# Patient Record
Sex: MALE | Race: WHITE | NOT HISPANIC OR LATINO | Employment: OTHER | URBAN - METROPOLITAN AREA
[De-identification: names, ages, dates, MRNs, and addresses within clinical notes are randomized per-mention and may not be internally consistent; named-entity substitution may affect disease eponyms.]

---

## 2017-01-23 ENCOUNTER — HOSPITAL ENCOUNTER (OUTPATIENT)
Dept: SLEEP MEDICINE | Facility: HOSPITAL | Age: 62
Discharge: HOME | End: 2017-01-23
Attending: INTERNAL MEDICINE | Admitting: INTERNAL MEDICINE

## 2017-01-23 PROCEDURE — G0463 HOSPITAL OUTPT CLINIC VISIT: HCPCS

## 2017-01-24 NOTE — PROGRESS NOTES
DATE OF VISIT: 01/23/2017    PROBLEM LIST:  1.  Obstructive sleep apnea with sleep-related hypoxemia, severe, with AHI of 53 on BiPAP SV due to complex central apnea.   2.  Obesity.   3.  Coronary artery disease status post recent MI.   4.  History of cerebrovascular accident.     HISTORY OF PRESENT ILLNESS: The patient is here for followup, was last seen on 01/25/2016 for sleep apnea that is severe with AHI of 53 and with complex central apnea on the CPAP. He was intolerant to the high pressure BiPAP. He has obesity and he has a history of cerebrovascular accident. Since the last visit the patient had another problem with acute myocardial infarction and he had no angioplasty necessary because it was reported to be a small vessel, but he does have history of angioplasty in the past. Other than that, he denies any change in the past medical, surgical, social, or family history; please refer to previous note for more details.     MEDICATIONS: He did not bring his list of medications but knows that they added a blood thinner, but he could not remember the name of it and I tried to mention a list of the ones we know but none of them rang a bell.  The patient is strongly advised to bring his medication list on his followup visit. He does have some mild residual hypersomnia.     REVIEW OF SYSTEMS: A 10-point system review is positive for acid reflux and depression.     PHYSICAL EXAMINATION:   VITAL SIGNS: Weight 198, blood pressure 122/79, heart rate 63.   HEENT: He has Mallampati IV airway with large uvula.   LUNGS: Unlabored.   HEART: Regular rate.   EXTREMITIES: No edema.     DIAGNOSTIC DATA: The sleep study showed AHI of 53, with sleep-related hypoxemia. Compliance download on BiPAP SV with a minimum EPAP of 9, maximum IPAP of 15 with a backup rate of 12 and a pressure support of 1. His AHI was 16.3 and this is consistent with a central apnea index of 2.3, obstructive apnea index of 9.8, and hypopnea index of 4.2.  Previous download showed a residual AHI of 13.8 as well.     ASSESSMENT:   1.  Obstructive sleep apnea with sleep-related hypoxemia, with CPAP triggered central sleep apnea switched to a BiPAP SV with need to a higher pressure for more optimal control. We will change the minimum EPAP of 12 and a maximum IPAP of 18 now that his tolerance to the pressure is much better. Will keep the pressure support of 1 and will set up the rate of 12, and will followup on the download in 2 months and consider using an oral appliance with the BiPAP at a lower pressure if he was intolerant to the higher pressure or if the higher pressure is triggering more central apneas.   2.  Obesity with instruction to work on the weight loss.   3.  Coronary artery disease, status post recent MI with the need to optimize his sleep apnea control with a goal to get his residual AHI less than 10 and preferably less than 5.   4.  History of cerebrovascular accident.  5.  Follow up in 2 months with the instruction to increase his minimum EPAP to 12 and a maximum IPAP to 18, and keep the other setting the same.         JUAN Walls:co  D:   01/23/2017 17:11:36  T:   01/24/2017 01:52:36  Job ID:   [id number]  Document ID:   [id number]  cc:   Referring Physician

## 2017-12-12 ENCOUNTER — OFFICE VISIT (OUTPATIENT)
Dept: FAMILY MEDICINE CLINIC | Facility: CLINIC | Age: 62
End: 2017-12-12

## 2017-12-12 VITALS
HEART RATE: 65 BPM | OXYGEN SATURATION: 95 % | SYSTOLIC BLOOD PRESSURE: 90 MMHG | TEMPERATURE: 98 F | BODY MASS INDEX: 31.99 KG/M2 | DIASTOLIC BLOOD PRESSURE: 74 MMHG | WEIGHT: 192 LBS | HEIGHT: 65 IN

## 2017-12-12 DIAGNOSIS — Z12.5 SPECIAL SCREENING EXAMINATION FOR NEOPLASM OF PROSTATE: Primary | ICD-10-CM

## 2017-12-12 DIAGNOSIS — Z86.73 HISTORY OF STROKE: ICD-10-CM

## 2017-12-12 DIAGNOSIS — I25.10 CORONARY ARTERY DISEASE INVOLVING NATIVE CORONARY ARTERY OF NATIVE HEART WITHOUT ANGINA PECTORIS: ICD-10-CM

## 2017-12-12 DIAGNOSIS — E78.5 HYPERLIPIDEMIA, UNSPECIFIED HYPERLIPIDEMIA TYPE: ICD-10-CM

## 2017-12-12 LAB
DEVELOPER EXPIRATION DATE: NORMAL
DEVELOPER LOT NUMBER: NORMAL
EXPIRATION DATE: NORMAL
FECAL OCCULT BLOOD SCREEN, POC: NEGATIVE
Lab: NORMAL
NEGATIVE CONTROL: NEGATIVE
POSITIVE CONTROL: POSITIVE

## 2017-12-12 PROCEDURE — 99214 OFFICE O/P EST MOD 30 MIN: CPT | Performed by: FAMILY MEDICINE

## 2017-12-12 PROCEDURE — 82270 OCCULT BLOOD FECES: CPT | Performed by: FAMILY MEDICINE

## 2017-12-12 RX ORDER — LANSOPRAZOLE 30 MG/1
30 CAPSULE, DELAYED RELEASE ORAL DAILY
Qty: 90 CAPSULE | Refills: 3 | Status: SHIPPED | OUTPATIENT
Start: 2017-12-12 | End: 2018-12-11 | Stop reason: SDUPTHER

## 2017-12-12 RX ORDER — CITALOPRAM 20 MG/1
20 TABLET ORAL DAILY
Qty: 90 TABLET | Refills: 3 | Status: SHIPPED | OUTPATIENT
Start: 2017-12-12 | End: 2018-12-11 | Stop reason: SDUPTHER

## 2017-12-12 RX ORDER — CITALOPRAM 20 MG/1
20 TABLET ORAL DAILY
COMMUNITY
End: 2017-12-12 | Stop reason: SDUPTHER

## 2017-12-12 NOTE — PROGRESS NOTES
SUBJECTIVE:  The patient is a 62-year-old white male is here for follow-up.  He has a history of stroke and coronary artery disease.  He had labs previously by his cardiologist.  These are reviewed.  He is due a prostate exam.  He complains of carpal tunnel syndrome and would like a referral for this.  He's tried preventative measures that have been unsuccessful.  It's affecting both his wrists and hands.     PAST MEDICAL HISTORY:  Reviewed.    REVIEW OF SYSTEMS:  Please see above; 14 point ROS otherwise negative.      OBJECTIVE: Vitals signs are reviewed and are stable.    HEENT: PERRLA.    Neck:  Supple.    Lungs:  Clear.    Heart:  Regular rate and rhythm.    Abdomen:   Soft, nontender.    Rectal: Soft 1-2+ nontender no masses.  Hemoccult negative.  Extremities:  No cyanosis, clubbing or edema.      ASSESSMENT:    History of stroke  Urinary artery disease  Tunnel syndrome  ProState exam    PLAN:  PSA ordered.  Patient will find out from his wife when his next colonoscopy is due.  He's referred to a hand surgeon for carpal tunnel syndrome.  Diet and exercise discussed.    Much of this encounter note is an electronic transcription/translation of spoken language to printed text.  The electronic translation of spoken language may permit erroneous, or at times, nonsensical words or phrases to be inadvertently transcribed.  Although I have reviewed the note for such errors, some may still exist.

## 2018-04-30 ENCOUNTER — OFFICE VISIT (OUTPATIENT)
Dept: SLEEP MEDICINE | Facility: HOSPITAL | Age: 63
End: 2018-04-30
Attending: INTERNAL MEDICINE

## 2018-04-30 VITALS
DIASTOLIC BLOOD PRESSURE: 77 MMHG | SYSTOLIC BLOOD PRESSURE: 140 MMHG | HEIGHT: 65 IN | HEART RATE: 64 BPM | BODY MASS INDEX: 31.57 KG/M2 | OXYGEN SATURATION: 98 % | WEIGHT: 189.5 LBS

## 2018-04-30 DIAGNOSIS — E66.9 OBESITY (BMI 30-39.9): ICD-10-CM

## 2018-04-30 DIAGNOSIS — G47.33 OSA ON CPAP: Primary | ICD-10-CM

## 2018-04-30 DIAGNOSIS — I69.828: ICD-10-CM

## 2018-04-30 DIAGNOSIS — I74.9 PARADOXICAL EMBOLISM (HCC): ICD-10-CM

## 2018-04-30 DIAGNOSIS — I10 BENIGN ESSENTIAL HTN: ICD-10-CM

## 2018-04-30 DIAGNOSIS — Z99.89 OSA ON CPAP: Primary | ICD-10-CM

## 2018-04-30 PROCEDURE — G0463 HOSPITAL OUTPT CLINIC VISIT: HCPCS

## 2018-04-30 RX ORDER — ISOSORBIDE MONONITRATE 30 MG/1
TABLET, EXTENDED RELEASE ORAL
COMMUNITY
Start: 2018-04-10

## 2018-09-13 ENCOUNTER — TRANSCRIBE ORDERS (OUTPATIENT)
Dept: ADMINISTRATIVE | Facility: HOSPITAL | Age: 63
End: 2018-09-13

## 2018-09-13 DIAGNOSIS — G56.03 BILATERAL CARPAL TUNNEL SYNDROME: Primary | ICD-10-CM

## 2018-09-27 ENCOUNTER — HOSPITAL ENCOUNTER (OUTPATIENT)
Dept: INFUSION THERAPY | Facility: HOSPITAL | Age: 63
Discharge: HOME | End: 2018-09-27
Attending: SURGERY | Admitting: PSYCHIATRY & NEUROLOGY

## 2018-09-27 DIAGNOSIS — G56.03 BILATERAL CARPAL TUNNEL SYNDROME: ICD-10-CM

## 2018-09-27 PROCEDURE — 95886 MUSC TEST DONE W/N TEST COMP: CPT

## 2018-09-27 PROCEDURE — 95911 NRV CNDJ TEST 9-10 STUDIES: CPT | Performed by: PSYCHIATRY & NEUROLOGY

## 2018-09-27 PROCEDURE — 95911 NRV CNDJ TEST 9-10 STUDIES: CPT

## 2018-09-27 PROCEDURE — 95886 MUSC TEST DONE W/N TEST COMP: CPT | Performed by: PSYCHIATRY & NEUROLOGY

## 2018-12-11 RX ORDER — LANSOPRAZOLE 30 MG/1
30 CAPSULE, DELAYED RELEASE ORAL DAILY
Qty: 90 CAPSULE | Refills: 0 | Status: SHIPPED | OUTPATIENT
Start: 2018-12-11 | End: 2019-02-05 | Stop reason: SDUPTHER

## 2018-12-11 RX ORDER — CITALOPRAM 20 MG/1
20 TABLET ORAL DAILY
Qty: 90 TABLET | Refills: 0 | Status: SHIPPED | OUTPATIENT
Start: 2018-12-11 | End: 2019-02-05 | Stop reason: SDUPTHER

## 2019-02-05 ENCOUNTER — OFFICE VISIT (OUTPATIENT)
Dept: FAMILY MEDICINE CLINIC | Facility: CLINIC | Age: 64
End: 2019-02-05

## 2019-02-05 VITALS
HEART RATE: 58 BPM | RESPIRATION RATE: 18 BRPM | BODY MASS INDEX: 32.49 KG/M2 | OXYGEN SATURATION: 98 % | DIASTOLIC BLOOD PRESSURE: 60 MMHG | SYSTOLIC BLOOD PRESSURE: 118 MMHG | HEIGHT: 65 IN | WEIGHT: 195 LBS | TEMPERATURE: 98 F

## 2019-02-05 DIAGNOSIS — I10 BENIGN ESSENTIAL HTN: ICD-10-CM

## 2019-02-05 DIAGNOSIS — Z12.11 SCREENING FOR COLON CANCER: ICD-10-CM

## 2019-02-05 DIAGNOSIS — I25.10 CORONARY ARTERY DISEASE INVOLVING NATIVE CORONARY ARTERY OF NATIVE HEART WITHOUT ANGINA PECTORIS: ICD-10-CM

## 2019-02-05 DIAGNOSIS — E66.9 OBESITY (BMI 30-39.9): ICD-10-CM

## 2019-02-05 DIAGNOSIS — K21.9 GASTROESOPHAGEAL REFLUX DISEASE WITHOUT ESOPHAGITIS: ICD-10-CM

## 2019-02-05 DIAGNOSIS — Z86.73 HISTORY OF STROKE: ICD-10-CM

## 2019-02-05 DIAGNOSIS — E78.2 MIXED HYPERLIPIDEMIA: ICD-10-CM

## 2019-02-05 DIAGNOSIS — Z12.5 SCREENING FOR PROSTATE CANCER: ICD-10-CM

## 2019-02-05 DIAGNOSIS — R79.89 ELEVATED SERUM CREATININE: Primary | ICD-10-CM

## 2019-02-05 LAB
ANION GAP SERPL CALCULATED.3IONS-SCNC: 11.6 MMOL/L
BACTERIA UR QL AUTO: NORMAL /HPF
BILIRUB UR QL STRIP: NEGATIVE
BUN BLD-MCNC: 19 MG/DL (ref 8–23)
BUN/CREAT SERPL: 18.8 (ref 7–25)
CALCIUM SPEC-SCNC: 9.4 MG/DL (ref 8.6–10.5)
CHLORIDE SERPL-SCNC: 106 MMOL/L (ref 98–107)
CLARITY UR: CLEAR
CO2 SERPL-SCNC: 27.4 MMOL/L (ref 22–29)
COLOR UR: YELLOW
CREAT BLD-MCNC: 1.01 MG/DL (ref 0.76–1.27)
DEVELOPER EXPIRATION DATE: NORMAL
DEVELOPER LOT NUMBER: NORMAL
EXPIRATION DATE: NORMAL
FECAL OCCULT BLOOD SCREEN, POC: NEGATIVE
GFR SERPL CREATININE-BSD FRML MDRD: 75 ML/MIN/1.73
GLUCOSE BLD-MCNC: 86 MG/DL (ref 65–99)
GLUCOSE UR STRIP-MCNC: NEGATIVE MG/DL
HGB UR QL STRIP.AUTO: NEGATIVE
KETONES UR QL STRIP: NEGATIVE
LEUKOCYTE ESTERASE UR QL STRIP.AUTO: NEGATIVE
Lab: NORMAL
NEGATIVE CONTROL: NEGATIVE
NITRITE UR QL STRIP: NEGATIVE
PH UR STRIP.AUTO: 6 [PH] (ref 4.6–8)
POSITIVE CONTROL: POSITIVE
POTASSIUM BLD-SCNC: 4.6 MMOL/L (ref 3.5–5.2)
PROT UR QL STRIP: NEGATIVE
PSA SERPL-MCNC: 0.14 NG/ML (ref 0–4)
RBC # UR: NORMAL /HPF
REF LAB TEST METHOD: NORMAL
SODIUM BLD-SCNC: 145 MMOL/L (ref 136–145)
SP GR UR STRIP: >=1.03 (ref 1–1.03)
SQUAMOUS #/AREA URNS HPF: NORMAL /HPF
UROBILINOGEN UR QL STRIP: NORMAL
WBC UR QL AUTO: NORMAL /HPF

## 2019-02-05 PROCEDURE — 80048 BASIC METABOLIC PNL TOTAL CA: CPT | Performed by: FAMILY MEDICINE

## 2019-02-05 PROCEDURE — 99214 OFFICE O/P EST MOD 30 MIN: CPT | Performed by: FAMILY MEDICINE

## 2019-02-05 PROCEDURE — G0438 PPPS, INITIAL VISIT: HCPCS | Performed by: FAMILY MEDICINE

## 2019-02-05 PROCEDURE — 36415 COLL VENOUS BLD VENIPUNCTURE: CPT | Performed by: FAMILY MEDICINE

## 2019-02-05 PROCEDURE — 96160 PT-FOCUSED HLTH RISK ASSMT: CPT | Performed by: FAMILY MEDICINE

## 2019-02-05 PROCEDURE — G0103 PSA SCREENING: HCPCS | Performed by: FAMILY MEDICINE

## 2019-02-05 PROCEDURE — 81001 URINALYSIS AUTO W/SCOPE: CPT | Performed by: FAMILY MEDICINE

## 2019-02-05 PROCEDURE — 82270 OCCULT BLOOD FECES: CPT | Performed by: FAMILY MEDICINE

## 2019-02-05 RX ORDER — PITAVASTATIN CALCIUM 4.18 MG/1
4 TABLET, FILM COATED ORAL DAILY
COMMUNITY
Start: 2018-12-23 | End: 2022-05-24

## 2019-02-05 RX ORDER — LANSOPRAZOLE 30 MG/1
30 CAPSULE, DELAYED RELEASE ORAL DAILY
Qty: 90 CAPSULE | Refills: 3 | Status: SHIPPED | OUTPATIENT
Start: 2019-02-05 | End: 2022-05-24 | Stop reason: SDUPTHER

## 2019-02-05 RX ORDER — CITALOPRAM 20 MG/1
20 TABLET ORAL DAILY
Qty: 90 TABLET | Refills: 3 | Status: SHIPPED | OUTPATIENT
Start: 2019-02-05 | End: 2020-03-17 | Stop reason: SDUPTHER

## 2019-02-05 NOTE — PROGRESS NOTES
SUBJECTIVE:  The patient is a 63-year-old white male who comes in for follow-up.  He was noted to have some elevated renal function on some outside lab.  He has history of kidney stones and was having some flank pain but this is resolved.  He has a history of stroke coronary artery disease GERD.  He states he is forgetful at times since he's had a stroke.  He is due a prostate exam.  PAST MEDICAL HISTORY:  Reviewed.    REVIEW OF SYSTEMS:  Please see above; 14 point ROS negative.      OBJECTIVE: Vitals signs are reviewed and are stable.   And oriented ×3  HEENT: PERRLA.   Neck:  Supple.   Lungs:  Clear.    Heart:  Regular rate and rhythm.   Abdomen:   Soft, nontender.  Rectal: Prostate 1-2+ soft nontender.  No masses.  Hemoccult negative.   Extremities:  No cyanosis, clubbing or edema.     ASSESSMENT:     Elevated renal function  Coronary artery disease  History of stroke  Hyperlipidemia  GERD  Prostate exam  PLAN: CMP PSA, urinalysis ordered.  Labs are reviewed.  He will follow up on labs.  Medications refilled.  Healthy lifestyle discussed.  He will call if problems.  He is advised to avoid NSAIDs.  Ultrasound kidneys ordered.  Follow up on labs.      Dictated utilizing Dragon dictation.

## 2019-02-05 NOTE — PROGRESS NOTES
QUICK REFERENCE INFORMATION:  The ABCs of the Annual Wellness Visit    Initial Medicare Wellness Visit    HEALTH RISK ASSESSMENT    1955    Recent Hospitalizations:  Recently treated at the following:  Other: [facility].        Current Medical Providers:  Patient Care Team:  Gary Camejo MD as PCP - General  Gary Camejo MD as PCP - Family Medicine  Cali Gibbons MD as Consulting Physician (Interventional Cardiology)  Tmoi Jeffers MD as Consulting Physician (Pulmonary Disease)  William Gloria MD as Consulting Physician (Hand Surgery)        Smoking Status:  Social History     Tobacco Use   Smoking Status Never Smoker       Alcohol Consumption:  Social History     Substance and Sexual Activity   Alcohol Use No    Comment: RARELY       Depression Screen:   PHQ-2/PHQ-9 Depression Screening 2/5/2019   Little interest or pleasure in doing things 0   Feeling down, depressed, or hopeless 1   Trouble falling or staying asleep, or sleeping too much 0   Feeling tired or having little energy 1   Poor appetite or overeating 0   Feeling bad about yourself - or that you are a failure or have let yourself or your family down 0   Trouble concentrating on things, such as reading the newspaper or watching television 1   Moving or speaking so slowly that other people could have noticed. Or the opposite - being so fidgety or restless that you have been moving around a lot more than usual 0   Thoughts that you would be better off dead, or of hurting yourself in some way 0   Total Score 3   If you checked off any problems, how difficult have these problems made it for you to do your work, take care of things at home, or get along with other people? Somewhat difficult       Health Habits and Functional and Cognitive Screening:  Functional & Cognitive Status 2/5/2019   Do you have difficulty preparing food and eating? No   Do you have difficulty bathing yourself, getting dressed or grooming yourself? No   Do you have  difficulty using the toilet? No   Do you have difficulty moving around from place to place? No   Do you have trouble with steps or getting out of a bed or a chair? No   In the past year have you fallen or experienced a near fall? No   Current Diet Well Balanced Diet   Dental Exam Up to date   Eye Exam Up to date   Exercise (times per week) 2 times per week   Current Exercise Activities Include No Regular Exercise   Do you need help using the phone?  No   Are you deaf or do you have serious difficulty hearing?  No   Do you need help with transportation? No   Do you need help shopping? No   Do you need help preparing meals?  No   Do you need help with housework?  No   Do you need help with laundry? No   Do you need help taking your medications? No   Do you need help managing money? No   Do you ever drive or ride in a car without wearing a seat belt? No   Have you felt unusual stress, anger or loneliness in the last month? No   Who do you live with? Spouse   If you need help, do you have trouble finding someone available to you? No   Have you been bothered in the last four weeks by sexual problems? No   Do you have difficulty concentrating, remembering or making decisions? No           Does the patient have evidence of cognitive impairment? No    Asiprin use counseling: Taking ASA appropriately as indicated      Recent Lab Results:    Visual Acuity:  No exam data present    Age-appropriate Screening Schedule:  Refer to the list below for future screening recommendations based on patient's age, sex and/or medical conditions. Orders for these recommended tests are listed in the plan section. The patient has been provided with a written plan.    Health Maintenance   Topic Date Due   • TDAP/TD VACCINES (1 - Tdap) 11/14/1975   • ZOSTER VACCINE (1 of 2) 11/14/2006   • LIPID PANEL  12/07/2017   • COLONOSCOPY  07/25/2018   • INFLUENZA VACCINE  Completed        Subjective   History of Present Illness    Nain Rangel is a  63 y.o. male who presents for an Annual Wellness Visit.    The following portions of the patient's history were reviewed and updated as appropriate: past family history.    Outpatient Medications Prior to Visit   Medication Sig Dispense Refill   • aspirin 81 MG tablet Take 81 mg by mouth Daily.     • clopidogrel (PLAVIX) 75 MG tablet Take 75 mg by mouth daily.     • ezetimibe (ZETIA) 10 MG tablet Take 10 mg by mouth daily.     • folic acid (FOLVITE) 1 MG tablet Take 1 mg by mouth daily.     • isosorbide mononitrate (IMDUR) 30 MG 24 hr tablet      • LIVALO 4 MG tablet Take 4 mg by mouth Daily.     • ranolazine (RANEXA) 1000 MG 12 hr tablet Take 500 mg by mouth 2 (Two) Times a Day.     • citalopram (CeleXA) 20 MG tablet TAKE 1 TABLET BY MOUTH DAILY 90 tablet 0   • lansoprazole (PREVACID) 30 MG capsule TAKE 1 CAPSULE BY MOUTH DAILY 90 capsule 0   • pravastatin (PRAVACHOL) 10 MG tablet Take 10 mg by mouth daily.  5   • PYRIDOXINE HCL PO Take  by mouth Daily.       No facility-administered medications prior to visit.        Patient Active Problem List   Diagnosis   • Stroke (CMS/HCC)   • Oth speech/lang deficits following oth cerebvasc disease   • Myocardial infarction (CMS/HCC)   • Hyperlipidemia   • Dizzy spells   • Cognitive deficits following cerebral infarction   • Chest discomfort   • MARCOS on CPAP   • Paradoxical embolism (CMS/HCC)   • Benign essential HTN   • Obesity (BMI 30-39.9)       Advance Care Planning:  has an advance directive - a copy HAS NOT been provided    Identification of Risk Factors:  Risk factors include: increased fall risk.    Review of Systems    Compared to one year ago, the patient feels his physical health is the same.  Compared to one year ago, the patient feels his mental health is the same.    Objective     Physical Exam    Vitals:    02/05/19 0837   BP: 118/60   BP Location: Left arm   Patient Position: Sitting   Pulse: 58   Resp: 18   Temp: 98 °F (36.7 °C)   TempSrc: Oral   SpO2: 98%  "  Weight: 88.5 kg (195 lb)   Height: 165.1 cm (65\")   PainSc: 0-No pain       Patient's Body mass index is 32.45 kg/m². BMI is above normal parameters. Recommendations include: no follow-up required.      Assessment/Plan   Patient Self-Management and Personalized Health Advice  The patient has been provided with information about: fall prevention and preventive services including:   · Fall Risk assessment done.    Visit Diagnoses:    ICD-10-CM ICD-9-CM   1. Elevated serum creatinine R79.89 790.99   2. Coronary artery disease involving native coronary artery of native heart without angina pectoris I25.10 414.01   3. History of stroke Z86.73 V12.54   4. Mixed hyperlipidemia E78.2 272.2   5. Obesity (BMI 30-39.9) E66.9 278.00   6. Benign essential HTN I10 401.1   7. Gastroesophageal reflux disease without esophagitis K21.9 530.81   8. Screening for prostate cancer Z12.5 V76.44       Orders Placed This Encounter   Procedures   • US Renal Bilateral     Standing Status:   Future     Standing Expiration Date:   2/5/2020     Order Specific Question:   Reason for Exam:     Answer:   Elevated creatinine   • Basic Metabolic Panel   • PSA Screen     Standing Status:   Future     Number of Occurrences:   1     Standing Expiration Date:   2/5/2020   • Urinalysis without microscopic (no culture) - Urine, Clean Catch   • Urinalysis, Microscopic Only - Urine, Clean Catch   • Urinalysis With Microscopic - Urine, Clean Catch       Outpatient Encounter Medications as of 2/5/2019   Medication Sig Dispense Refill   • aspirin 81 MG tablet Take 81 mg by mouth Daily.     • citalopram (CeleXA) 20 MG tablet Take 1 tablet by mouth Daily. 90 tablet 3   • clopidogrel (PLAVIX) 75 MG tablet Take 75 mg by mouth daily.     • ezetimibe (ZETIA) 10 MG tablet Take 10 mg by mouth daily.     • folic acid (FOLVITE) 1 MG tablet Take 1 mg by mouth daily.     • isosorbide mononitrate (IMDUR) 30 MG 24 hr tablet      • lansoprazole (PREVACID) 30 MG capsule Take " 1 capsule by mouth Daily. 90 capsule 3   • LIVALO 4 MG tablet Take 4 mg by mouth Daily.     • ranolazine (RANEXA) 1000 MG 12 hr tablet Take 500 mg by mouth 2 (Two) Times a Day.     • [DISCONTINUED] citalopram (CeleXA) 20 MG tablet TAKE 1 TABLET BY MOUTH DAILY 90 tablet 0   • [DISCONTINUED] lansoprazole (PREVACID) 30 MG capsule TAKE 1 CAPSULE BY MOUTH DAILY 90 capsule 0   • pravastatin (PRAVACHOL) 10 MG tablet Take 10 mg by mouth daily.  5   • PYRIDOXINE HCL PO Take  by mouth Daily.       No facility-administered encounter medications on file as of 2/5/2019.        Reviewed use of high risk medication in the elderly: not applicable  Reviewed for potential of harmful drug interactions in the elderly: not applicable    Follow Up:  No Follow-up on file.     An After Visit Summary and PPPS with all of these plans were given to the patient.

## 2019-02-05 NOTE — PATIENT INSTRUCTIONS
Medicare Wellness  Personal Prevention Plan of Service     Date of Office Visit:  2019  Encounter Provider:  Gary Camejo MD  Place of Service:  [facility]  Patient Name: Nain Rangel  :  1955    As part of the Medicare Wellness portion of your visit today, we are providing you with this personalized preventive plan of services (PPPS). This plan is based upon recommendations of the [place] Preventive Services Task Force (USPSTF) and the Advisory Committee on Immunization Practices (ACIP).    This lists the preventive care services that should be considered, and provides dates of when you are due. Items listed as completed are up-to-date and do not require any further intervention.    Health Maintenance   Topic Date Due   • TDAP/TD VACCINES (1 - Tdap) 1975   • ZOSTER VACCINE (1 of 2) 2006   • HEPATITIS C SCREENING  2016   • MEDICARE ANNUAL WELLNESS  2016   • LIPID PANEL  2017   • COLONOSCOPY  2018   • INFLUENZA VACCINE  Completed       Orders Placed This Encounter   Procedures   • US Renal Bilateral     Standing Status:   Future     Standing Expiration Date:   2020     Order Specific Question:   Reason for Exam:     Answer:   Elevated creatinine   • Basic Metabolic Panel   • PSA Screen     Standing Status:   Future     Number of Occurrences:   1     Standing Expiration Date:   2020   • Urinalysis without microscopic (no culture) - Urine, Clean Catch   • Urinalysis, Microscopic Only - Urine, Clean Catch   • Urinalysis With Microscopic - Urine, Clean Catch       No Follow-up on file.

## 2019-02-14 ENCOUNTER — APPOINTMENT (OUTPATIENT)
Dept: ULTRASOUND IMAGING | Facility: HOSPITAL | Age: 64
End: 2019-02-14

## 2019-02-18 ENCOUNTER — HOSPITAL ENCOUNTER (OUTPATIENT)
Dept: ULTRASOUND IMAGING | Facility: HOSPITAL | Age: 64
Discharge: HOME | End: 2019-02-18
Admitting: FAMILY MEDICINE

## 2019-02-18 DIAGNOSIS — R79.89 ELEVATED SERUM CREATININE: ICD-10-CM

## 2019-02-18 PROCEDURE — 76775 US EXAM ABDO BACK WALL LIM: CPT

## 2019-04-29 ENCOUNTER — OFFICE VISIT (OUTPATIENT)
Dept: SLEEP MEDICINE | Facility: HOSPITAL | Age: 64
End: 2019-04-29
Attending: INTERNAL MEDICINE

## 2019-04-29 VITALS
OXYGEN SATURATION: 95 % | WEIGHT: 191.8 LBS | BODY MASS INDEX: 31.96 KG/M2 | HEART RATE: 64 BPM | HEIGHT: 65 IN | SYSTOLIC BLOOD PRESSURE: 103 MMHG | DIASTOLIC BLOOD PRESSURE: 69 MMHG

## 2019-04-29 DIAGNOSIS — G47.30 HYPERSOMNIA WITH SLEEP APNEA: ICD-10-CM

## 2019-04-29 DIAGNOSIS — Z99.89 OSA ON CPAP: Primary | ICD-10-CM

## 2019-04-29 DIAGNOSIS — G47.33 OSA ON CPAP: Primary | ICD-10-CM

## 2019-04-29 DIAGNOSIS — I10 BENIGN ESSENTIAL HTN: ICD-10-CM

## 2019-04-29 DIAGNOSIS — I74.9 PARADOXICAL EMBOLISM (HCC): ICD-10-CM

## 2019-04-29 DIAGNOSIS — E66.9 OBESITY (BMI 30-39.9): ICD-10-CM

## 2019-04-29 DIAGNOSIS — G47.10 HYPERSOMNIA WITH SLEEP APNEA: ICD-10-CM

## 2019-04-29 DIAGNOSIS — G47.34 SLEEP RELATED HYPOXIA: ICD-10-CM

## 2019-04-29 PROCEDURE — G0463 HOSPITAL OUTPT CLINIC VISIT: HCPCS

## 2019-04-29 NOTE — PROGRESS NOTES
Sleep Disorder Follow Up    Patient Name: Nain Rangel  Age/Sex: 63 y.o. male  : 1955  MRN: [id number]    Date of Encounter Visit: 19   Referring Provider: Tomi Jeffers MD  Place of Service: [facility]  Patient Care Team:  Gary Camejo MD as PCP - General  Gary Camejo MD as PCP - Family Medicine  EdwigeCali wells MD as Consulting Physician (Interventional Cardiology)  Tomi Jeffers MD as Consulting Physician (Pulmonary Disease)  William Gloria MD as Consulting Physician (Hand Surgery)    PROBLEM LIST:  1. Severe obstructive sleep apnea with complex central apnea on BiPAP SV  2. Sleep-related hypoxemia  3. Obesity  4. Coronary artery disease status post myocardial infarction  5. History of cerebrovascular accident    History of Present Illness:  Nain Rangel is a 63 y.o. male who was last seen in the office on 2018 for Severe obstructive sleep apnea with sleep-related hypoxemia, obesity, coronary artery disease and prior stroke.  Patient had a sleep study in  that showed an overall AHI of 53 with significant sleep-related hypoxemia and complex PAP triggered central apneas. Patient was intolerant to CPAP and titration study showed improvement of central apneas and hypoxemia on BiPAP SV at higher pressures and was started on auto with max IPAP of 25, min EPAP 20, PS of 2-4 and rate of 12 BPM. He was intolerant to the BiPAP at higher pressures and pressure was reduced. On 17, he had a residual AHI of 16.3 (was 13.8 previously) and were mostly obstructive and hypopneas so his pressure was increased to min EPAP 12, max IPAP of 18 and continued pressure support of 1. On 2018 we increased the minimum EPAP to 13 and the pressure support to 2  Patient sleeps better on the BiPAP, his daytime hypersomnia have resolved and he is happy with the machine and plans to continue to use it    Since last visit, he has been doing well overall with  no complaints, no new medical problems.  He did have a recent stress test and was shown to have some left ventricular hypertrophy, his cardiologist ordered a sleep study however after being informed that he is already on the BiPAP they want to make sure that his sleep apnea is well controlled.  Patient is on BiPAP and uses it every night with no complaints from the mask or the pressure.  Patient uses a full face mask, which does fit well. Patient denies any air leak or dry mouth.   Patient's equipment supplier is [employer] and last mask replacement was about 3 months ago. He needs to change to another DME because  Medicare changes  Patient sleeps better and has a deeper sleep with the BiPAP and feels more energy during the day time.  Current BiPAP SV setting auto with min EPAP 13, MAX IPAP 18, PS 2 cm H20 with rate of 12.  [place] Sleepiness Scale (ESS) is 2.  Compliance download was reviewed and documented below.  Weight is up 2 pounds since last visit.  Other comorbidities include CAD s/p MI, CVA, ASD s/p closure, HTN, HLD, paradoxical embolism and obesity.     Review of Systems:   A ten-system review was conducted and was negative except for acid reflux/heartburn.   Please refer to the follow up sleep questionnaire page one for details.    Past Medical History:  Past medical, surgical, social, and family history, except as mentioned above, was unchanged from the last visit.     Past Medical History:   Diagnosis Date   • Chest discomfort     PRESUME MUSCULOSKELETAL   • Cognitive deficits following cerebral infarction    • Dizzy spells    • Heart attack (CMS/HCC)    • Hyperlipidemia    • Myocardial infarction (CMS/HCC)    • Oth speech/lang deficits following oth cerebvasc disease    • Stroke (CMS/HCC)        Past Surgical History:   Procedure Laterality Date   • BACK SURGERY     • CARDIAC SURGERY      stent    • COLONOSCOPY     • CORONARY STENT PLACEMENT  2011       Home Medications:     Current Outpatient Medications:  "  •  aspirin 81 MG tablet, Take 81 mg by mouth Daily., Disp: , Rfl:   •  citalopram (CeleXA) 20 MG tablet, Take 1 tablet by mouth Daily., Disp: 90 tablet, Rfl: 3  •  clopidogrel (PLAVIX) 75 MG tablet, Take 75 mg by mouth daily., Disp: , Rfl:   •  ezetimibe (ZETIA) 10 MG tablet, Take 10 mg by mouth daily., Disp: , Rfl:   •  folic acid (FOLVITE) 1 MG tablet, Take 1 mg by mouth daily., Disp: , Rfl:   •  isosorbide mononitrate (IMDUR) 30 MG 24 hr tablet, , Disp: , Rfl:   •  lansoprazole (PREVACID) 30 MG capsule, Take 1 capsule by mouth Daily., Disp: 90 capsule, Rfl: 3  •  LIVALO 4 MG tablet, Take 4 mg by mouth Daily., Disp: , Rfl:   •  pravastatin (PRAVACHOL) 10 MG tablet, Take 10 mg by mouth daily., Disp: , Rfl: 5  •  PYRIDOXINE HCL PO, Take  by mouth Daily., Disp: , Rfl:   •  ranolazine (RANEXA) 1000 MG 12 hr tablet, Take 500 mg by mouth 2 (Two) Times a Day., Disp: , Rfl:     Allergies:  No Known Allergies    Past Social History:  Social History     Socioeconomic History   • Marital status:      Spouse name: Not on file   • Number of children: Not on file   • Years of education: Not on file   • Highest education level: Not on file   Tobacco Use   • Smoking status: Never Smoker   Substance and Sexual Activity   • Alcohol use: No     Comment: RARELY   • Drug use: No       Past Family History:  Family History   Problem Relation Age of Onset   • Heart disease Mother    • Hypertension Mother    • Alcohol abuse Mother         NOT FOR MANY YEARS   • Cancer Sister    • Heart disease Father          Objective:   Done and documented on sleep disorders center physical examination sheet, please refer to hand written note on the chart for details about the other pertinent negative findings.    Vital Signs:   Visit Vitals  /69 (BP Location: Right arm, Patient Position: Sitting)   Pulse 64   Ht 165.1 cm (65\")   Wt 87 kg (191 lb 12.8 oz)   SpO2 95%   BMI 31.92 kg/m²     Wt Readings from Last 3 Encounters:   04/29/19 87 " kg (191 lb 12.8 oz)   02/05/19 88.5 kg (195 lb)   04/30/18 86 kg (189 lb 8 oz)          Physical Exam:   General: AAOx3. Normal mood and affect.   HEENT:  Moist mucous membranes.  Septum midline. Mallampati 4 airway, large tongue and slightly enlarged uvula  LUNGS: Non-labored breathing. CTAB. No wheezes.  HEART: Regular rate and rhythm. No murmur. Normal s1/s2  EXTREMITIES: no edema. No cyanosis or clubbing. Normal gait    Diagnostic Data:  NPSG on 11/28/15 and a weight of 198 pounds showed an overall AHI of 53.4 then increase to 65 while on right side.  Significant sleep-related hypoxia with thiago desaturation of 60.9% and 20.8 minutes of total sleep time spent at less than 90% with 6.6 minutes spent at less than 80%.  Arousal index of 53.8.    Titration study on 11/28/15.  Patient was titrated on CPAP of 5-12 and was intolerant was changed to a BiPAP.  BiPAP titration from 12/8 18/14 and had residual hypoxia and central apnea triggered at lower pressures.  Patient was started on BiPAP SV and central apnea and improved especially with higher pressures.  Recommended auto BiPAP SV with IPAP of 25, EPAP of 20 pressures support of 4-8 with a backup rate of 12.    Overnight oximetry in 2015 on BiPAP SV at settings of IPAP 25, EPAP 20, PS 4-8 with backup rate of 12 showed only 56 seconds below 89% with 2 clusters of desaturations thought to be positional or REM related.    Compliance download on BiPAP SV from 1/23/17 with a minimum EPAP of 9, maximum IPAP of 15 with a backup rate of 12 and a pressure support of 1. His AHI was 16.3 and this is consistent with a central apnea index of 2.3, obstructive apnea index of 9.8, and hypopnea index of 4.2. Previous download showed a residual AHI of 13.8 as well.     Compliance download from 3/31/18 to 4/29/18 showed 100% compliant with average use of 6 hours and 55 minutes area did residual AHI of 14.8 (CA 2.3, OA 6.7, HI 5.6) on auto BiPAP SV with a minimum EPAP 12, max IPAP 18  and PS of 1 with breath rate of 12.  90th percentile EPAP of 13.8 cm H2O average EPAP of 12.8 cm H2O.  3.2% of night and periodic breathing with 34 seconds of average time spent with large air leak.  Average breath rate of 15.3 breaths per minute.  Average minute event of 6.9.    Compliance download 4/29/2018: 100% with 7 hours and 49 min, leak is minimal and residual AHI is 12.2  On BiPAP SV min EPAP 13, Max pressure 18, PS 2 and residual AHI od 12.2     Assessment and Plan:       ICD-10-CM ICD-9-CM   1. MARCOS on CPAP G47.33 327.23    Z99.89 V46.8   2. Obesity (BMI 30-39.9) E66.9 278.00   3. Paradoxical embolism (CMS/[facility]) I74.9 444.9   4. Benign essential HTN I10 401.1   5. Sleep related hypoxia G47.34 327.24       Recommendations:   Patient had severe MARCOS with emergence of complex central apnea on the PAP requiring the use of BiPAP SV auto mode. based on the last download we had to adjust the pressure to a higher limit and that has resulted in minor improvement in the residual AHI and now it is down in the mild range of 12.2.  Further increase in the pressure should result in further improvement and the patient was reluctant given his previous experience with higher pressures, but he is willing to give it a try if we have only a modest increase in the pressure range.  The overnight oximetry done on the BiPAP SV showed that there is no residual sleep-related hypoxemia.  At this point we are accepting the residual mild sleep apnea which should not have any significant cardiovascular risk factor initially when the hypoxemia is no longer present,  even though he has mild residual sleep apnea, but that is a significant improvement from initial AHI of 53.  Patient had paradoxical embolism with CVA and he had a closure of a septal defect and he is followed by cardiology.  Patient is to continue to work on the weight loss   Patient reports significant improvement in the daytime hypersomnia on the BiPAP, is getting  subjective and clinical benefit and it is recommended to be continued specially that he is compliant with the treatment and is having significant improvement in his sleep apnea on     We'll continue the yearly follow-up and will see sooner as needed    No orders of the defined types were placed in this encounter.    Return in about 1 year (around 4/29/2020).    Tomi Jeffers MD   [facility]   04/29/19  8:23 AM    Dictated utilizing Dragon dictation:  Much of this encounter note is an electronic transcription/translation of spoken language to printed text. The electronic translation of spoken language may permit erroneous, or at times, nonsensical words or phrases to be inadvertently transcribed; Although I have reviewed the note for such errors, some may still exist.

## 2020-02-06 ENCOUNTER — APPOINTMENT (OUTPATIENT)
Dept: GENERAL RADIOLOGY | Facility: HOSPITAL | Age: 65
End: 2020-02-06

## 2020-02-06 ENCOUNTER — HOSPITAL ENCOUNTER (EMERGENCY)
Facility: HOSPITAL | Age: 65
Discharge: HOME | End: 2020-02-06
Attending: EMERGENCY MEDICINE | Admitting: EMERGENCY MEDICINE

## 2020-02-06 VITALS
WEIGHT: 180 LBS | BODY MASS INDEX: 29.99 KG/M2 | OXYGEN SATURATION: 95 % | TEMPERATURE: 97.9 F | HEART RATE: 59 BPM | SYSTOLIC BLOOD PRESSURE: 131 MMHG | HEIGHT: 65 IN | DIASTOLIC BLOOD PRESSURE: 89 MMHG | RESPIRATION RATE: 18 BRPM

## 2020-02-06 DIAGNOSIS — M54.50 ACUTE BILATERAL LOW BACK PAIN, UNSPECIFIED WHETHER SCIATICA PRESENT: Primary | ICD-10-CM

## 2020-02-06 LAB
BACTERIA UR QL AUTO: NORMAL /HPF
BILIRUB UR QL STRIP: NEGATIVE
CLARITY UR: CLEAR
COLOR UR: YELLOW
GLUCOSE UR STRIP-MCNC: NEGATIVE MG/DL
HGB UR QL STRIP.AUTO: NEGATIVE
HYALINE CASTS UR QL AUTO: NORMAL /LPF
KETONES UR QL STRIP: NEGATIVE
LEUKOCYTE ESTERASE UR QL STRIP.AUTO: ABNORMAL
NITRITE UR QL STRIP: NEGATIVE
PH UR STRIP.AUTO: <=5 [PH] (ref 5–8)
PROT UR QL STRIP: NEGATIVE
RBC # UR: NORMAL /HPF
REF LAB TEST METHOD: NORMAL
SP GR UR STRIP: 1.02 (ref 1–1.03)
SQUAMOUS #/AREA URNS HPF: NORMAL /HPF
UROBILINOGEN UR QL STRIP: ABNORMAL
WBC UR QL AUTO: NORMAL /HPF

## 2020-02-06 PROCEDURE — 72110 X-RAY EXAM L-2 SPINE 4/>VWS: CPT

## 2020-02-06 PROCEDURE — 25010000002 KETOROLAC TROMETHAMINE PER 15 MG: Performed by: EMERGENCY MEDICINE

## 2020-02-06 PROCEDURE — 96372 THER/PROPH/DIAG INJ SC/IM: CPT

## 2020-02-06 PROCEDURE — 63710000001 PREDNISONE PER 1 MG: Performed by: EMERGENCY MEDICINE

## 2020-02-06 PROCEDURE — 99284 EMERGENCY DEPT VISIT MOD MDM: CPT

## 2020-02-06 PROCEDURE — 81001 URINALYSIS AUTO W/SCOPE: CPT | Performed by: EMERGENCY MEDICINE

## 2020-02-06 RX ORDER — PREDNISONE 10 MG/1
TABLET ORAL
Qty: 28 TABLET | Refills: 0 | Status: SHIPPED | OUTPATIENT
Start: 2020-02-06 | End: 2022-05-24

## 2020-02-06 RX ORDER — METHOCARBAMOL 500 MG/1
500 TABLET, FILM COATED ORAL 4 TIMES DAILY PRN
Qty: 15 TABLET | Refills: 0 | Status: SHIPPED | OUTPATIENT
Start: 2020-02-06 | End: 2022-05-24

## 2020-02-06 RX ORDER — PREDNISONE 20 MG/1
60 TABLET ORAL ONCE
Status: COMPLETED | OUTPATIENT
Start: 2020-02-06 | End: 2020-02-06

## 2020-02-06 RX ORDER — METHOCARBAMOL 750 MG/1
750 TABLET, FILM COATED ORAL ONCE
Status: COMPLETED | OUTPATIENT
Start: 2020-02-06 | End: 2020-02-06

## 2020-02-06 RX ORDER — PANTOPRAZOLE SODIUM 40 MG/1
40 TABLET, DELAYED RELEASE ORAL DAILY
COMMUNITY

## 2020-02-06 RX ORDER — KETOROLAC TROMETHAMINE 30 MG/ML
30 INJECTION, SOLUTION INTRAMUSCULAR; INTRAVENOUS ONCE
Status: COMPLETED | OUTPATIENT
Start: 2020-02-06 | End: 2020-02-06

## 2020-02-06 RX ADMIN — KETOROLAC TROMETHAMINE 30 MG: 30 INJECTION, SOLUTION INTRAMUSCULAR; INTRAVENOUS at 10:57

## 2020-02-06 RX ADMIN — PREDNISONE 60 MG: 20 TABLET ORAL at 10:55

## 2020-02-06 RX ADMIN — METHOCARBAMOL 750 MG: 750 TABLET ORAL at 10:54

## 2020-02-06 NOTE — ED TRIAGE NOTES
Pt states that his lower back has been hurting for the past week.   States he has a hx of back pain.   Denies numbness or tingling down his legs.   States the pain became too much today.

## 2020-02-06 NOTE — PROGRESS NOTES
Entered room, identified self, explained role, and verified facesheet information.  Patient preparing to d/c home; denied need for additional services or DME post-discharge.  No further questions or concerns.  Christo Baker RN

## 2020-02-06 NOTE — ED PROVIDER NOTES
EMERGENCY DEPARTMENT ENCOUNTER    Room Number:  39/39  Date of encounter:  2/6/2020  PCP: Gary Camejo MD  Historian: patient and wife      HPI:  Chief Complaint: low back pain  A complete HPI/ROS/PMH/PSH/SH/FH are unobtainable due to: N/A   Context: Nain Rangel is a 64 y.o. male who presents to the ED c/o gradual onset diffuse low back pain beginning one week ago. Pt reports sx have worsened. Pt states hydrocodone and advil did not improve sx. Pt denies radiation. Pt denies saddle paresthesias, incontinence, hematuria and fever. Pt states he has been doing a lot of bending over while working at home. Hx of MI and CVA. PSHx of back surgery 40 years ago.     PAST MEDICAL HISTORY  Active Ambulatory Problems     Diagnosis Date Noted   • Stroke (CMS/HCC)    • Oth speech/lang deficits following oth cerebvasc disease    • Myocardial infarction (CMS/HCC)    • Hyperlipidemia    • Dizzy spells    • Cognitive deficits following cerebral infarction    • Chest discomfort    • MARCOS on CPAP 04/30/2018   • Paradoxical embolism (CMS/HCC) 04/30/2018   • Benign essential HTN 04/30/2018   • Obesity (BMI 30-39.9) 04/30/2018   • Sleep related hypoxia 04/29/2019   • Hypersomnia with sleep apnea 04/29/2019     Resolved Ambulatory Problems     Diagnosis Date Noted   • No Resolved Ambulatory Problems     Past Medical History:   Diagnosis Date   • Heart attack (CMS/HCC)          PAST SURGICAL HISTORY  Past Surgical History:   Procedure Laterality Date   • BACK SURGERY     • CARDIAC SURGERY      stent    • COLONOSCOPY     • CORONARY STENT PLACEMENT  2011         FAMILY HISTORY  Family History   Problem Relation Age of Onset   • Heart disease Mother    • Hypertension Mother    • Alcohol abuse Mother         NOT FOR MANY YEARS   • Cancer Sister    • Heart disease Father          SOCIAL HISTORY  Social History     Socioeconomic History   • Marital status:      Spouse name: Not on file   • Number of children: Not on file   • Years  of education: Not on file   • Highest education level: Not on file   Tobacco Use   • Smoking status: Never Smoker   Substance and Sexual Activity   • Alcohol use: No     Comment: RARELY   • Drug use: No         ALLERGIES  Patient has no known allergies.        REVIEW OF SYSTEMS  Review of Systems   Constitutional: Negative for activity change, appetite change and fever.   HENT: Negative for congestion and sore throat.    Eyes: Negative.    Respiratory: Negative for cough and shortness of breath.    Cardiovascular: Negative for chest pain and leg swelling.   Gastrointestinal: Negative for abdominal pain, diarrhea, nausea and vomiting.        No incontinence   Endocrine: Negative.    Genitourinary: Negative for decreased urine volume, dysuria, enuresis and hematuria.   Musculoskeletal: Positive for back pain (diffuse lower). Negative for gait problem and neck pain.   Skin: Negative for rash and wound.   Allergic/Immunologic: Negative.    Neurological: Negative for weakness, numbness and headaches.        No saddle paresthesias    Hematological: Negative.    Psychiatric/Behavioral: Negative.    All other systems reviewed and are negative.       All systems reviewed and negative except for those discussed in HPI.       PHYSICAL EXAM    I have reviewed the triage vital signs and nursing notes.    ED Triage Vitals   Temp Heart Rate Resp BP SpO2   02/06/20 0816 02/06/20 0816 02/06/20 0816 02/06/20 0918 02/06/20 0816   97.9 °F (36.6 °C) 72 16 129/86 98 %      Temp src Heart Rate Source Patient Position BP Location FiO2 (%)   02/06/20 0816 02/06/20 0816 02/06/20 1140 02/06/20 0918 --   Tympanic Monitor Lying Right arm          Physical Exam    Physical Exam   Constitutional: No distress.   HENT:  Head: Normocephalic and atraumatic.   Oropharynx: Mucous membranes are moist.   Eyes: No scleral icterus. No conjunctival pallor.  Neck: Painless range of motion noted. Neck supple.   Cardiovascular: Normal rate, regular rhythm and  intact distal pulses.  Pulmonary/Chest: No respiratory distress. There are no wheezes, no rhonchi, and no rales.   Abdominal: Soft. There is no tenderness. There is no rebound and no guarding.   Musculoskeletal: Moves all extremities equally. There is no pedal edema or calf tenderness. normal dorsal flexion and plantar flexion  Neurological: Alert.  Baseline strength and sensation noted. sensation intact throughout BLE to light touch, normal DP and PT pulses; bilateral Achilles and patellar tendon reflexes normal  Skin: Skin is pink, warm, and dry. No pallor.  well healed midline scar in lumbar area  Psychiatric: Mood and affect normal.   Nursing note and vitals reviewed.    LAB RESULTS  Recent Results (from the past 24 hour(s))   Urinalysis With Microscopic If Indicated (No Culture) - Urine, Clean Catch    Collection Time: 02/06/20  9:43 AM   Result Value Ref Range    Color, UA Yellow Yellow, Straw    Appearance, UA Clear Clear    pH, UA <=5.0 5.0 - 8.0    Specific Gravity, UA 1.021 1.005 - 1.030    Glucose, UA Negative Negative    Ketones, UA Negative Negative    Bilirubin, UA Negative Negative    Blood, UA Negative Negative    Protein, UA Negative Negative    Leuk Esterase, UA Trace (A) Negative    Nitrite, UA Negative Negative    Urobilinogen, UA 0.2 E.U./dL 0.2 - 1.0 E.U./dL   Urinalysis, Microscopic Only - Urine, Clean Catch    Collection Time: 02/06/20  9:43 AM   Result Value Ref Range    RBC, UA 0-2 None Seen, 0-2 /HPF    WBC, UA 0-2 None Seen, 0-2 /HPF    Bacteria, UA None Seen None Seen /HPF    Squamous Epithelial Cells, UA 0-2 None Seen, 0-2 /HPF    Hyaline Casts, UA None Seen None Seen /LPF    Methodology Automated Microscopy        Ordered the above labs and independently reviewed the results.        RADIOLOGY  Xr Spine Lumbar Complete 4+vw    Result Date: 2/6/2020  LUMBAR SPINE: AP, LATERAL, BILATERAL OBLIQUE, SPOT LUMBOSACRAL  HISTORY: Low back pain, onset 1 week ago.  COMPARISON: None.  FINDINGS:  There is moderate endplate spur formation. Disc space narrowing is present at L5-S1. Facet arthritis is present in the lower lumbar spine. There is minimal chronic-appearing anterior wedging at L1. There is no evidence for fracture or acute abnormality. The soft tissues appear within normal limits.      Mild degenerative disc disease with endplate spurring. Disc space narrowing greatest at L5-S1. Facet arthritis. No evidence for acute abnormality.        I ordered the above noted radiological studies. Reviewed by me and discussed with radiologist.  See dictation for official radiology interpretation.      PROCEDURES    Procedures      MEDICATIONS GIVEN IN ER    Medications   predniSONE (DELTASONE) tablet 60 mg (60 mg Oral Given 2/6/20 1055)   ketorolac (TORADOL) injection 30 mg (30 mg Intramuscular Given 2/6/20 1057)   methocarbamol (ROBAXIN) tablet 750 mg (750 mg Oral Given 2/6/20 1054)         PROGRESS, DATA ANALYSIS, CONSULTS, AND MEDICAL DECISION MAKING    0933 ordered L spine XR and urinalysis for further evaluation. Ordered robaxin, toradol and prednisone for pain.     1215 pt rechecked and states his pain has improved. Discussed plan to discharge with steroids and muscle relaxers. Pt understands and agrees with the plan. All questions have been answered.    All labs have been independently reviewed by me.  All radiology studies have been reviewed by me and discussed with radiologist dictating the report.   EKG's independently viewed and interpreted by me.  Discussion below represents my analysis of pertinent findings related to patient's condition, differential diagnosis, treatment plan and final disposition.           AS OF 12:31 PM VITALS:    BP - 127/93  HR - 61  TEMP - 97.9 °F (36.6 °C) (Tympanic)  O2 SATS - 97%        DIAGNOSIS  Final diagnoses:   Acute bilateral low back pain, unspecified whether sciatica present         DISPOSITION  DISCHARGE    Patient discharged in stable condition.    Reviewed  implications of results, diagnosis, meds, responsibility to follow up, warning signs and symptoms of possible worsening, potential complications and reasons to return to ER, including new or worsening sx.    Patient/Family voiced understanding of above instructions.    Discussed plan for discharge, as there is no emergent indication for admission. Patient referred to primary care provider for BP management due to today's BP. Pt/family is agreeable and understands need for follow up and repeat testing.  Pt is aware that discharge does not mean that nothing is wrong but it indicates no emergency is present that requires admission and they must continue care with follow-up as given below or physician of their choice.     FOLLOW-UP  Gary Camejo MD  [address] [id number] [phone]    Schedule an appointment as soon as possible for a visit in 1 week  If symptoms fail to improve         Medication List      New Prescriptions    methocarbamol 500 MG tablet  Commonly known as:  ROBAXIN  Take 1 tablet by mouth 4 (Four) Times a Day As Needed for Muscle Spasms.     predniSONE 10 MG tablet  Commonly known as:  DELTASONE  Take 4 tablets by mouth daily for 5 days then 2 tablets by mouth daily for   3 days then 1 tablet by mouth daily for 2 days              [facility] query complete (#[id number]). Treatment plan to include limited course of prescribed  controlled substance. Risks including addiction, benefits, and alternatives presented to patient.       Documentation assistance provided by roddy Dumont for Brady Dean MD.  Information recorded by the scribe was done at my direction and has been verified and validated by me.                     Lisa Dumont  02/06/20 [id number]       Brady Dean MD  02/06/20 [id number]

## 2020-02-06 NOTE — ED NOTES
"Pt to this ER with chronic back pain, has been worsening over the last week. Pain reached the point he \"couldn't take it anymore\" Pain is located in the low back,  Denies recent fall or possible injury. Denies numbness, tingling in BLE. Able to move both. Pt denies incontinence or bowel issues   pt is in NAD, Skin warm and dry, a/ox4, HR 58, RR 17, o2 99%, bp 129/86          Ulises Rosario, RADHA  02/06/20 0922       Ulises Rosario RN  02/06/20 0934    "

## 2020-02-13 ENCOUNTER — EPISODE CHANGES (OUTPATIENT)
Dept: CASE MANAGEMENT | Facility: OTHER | Age: 65
End: 2020-02-13

## 2020-02-21 ENCOUNTER — EPISODE CHANGES (OUTPATIENT)
Dept: CASE MANAGEMENT | Facility: OTHER | Age: 65
End: 2020-02-21

## 2020-02-25 ENCOUNTER — EPISODE CHANGES (OUTPATIENT)
Dept: CASE MANAGEMENT | Facility: OTHER | Age: 65
End: 2020-02-25

## 2020-03-17 RX ORDER — CITALOPRAM 20 MG/1
20 TABLET ORAL DAILY
Qty: 90 TABLET | Refills: 3 | Status: SHIPPED | OUTPATIENT
Start: 2020-03-17 | End: 2021-04-19

## 2020-03-17 NOTE — TELEPHONE ENCOUNTER
PATIENT IS REQUESTING A REFILL ON HIS citalopram (CeleXA) 20 MG tablet. I CONFIRMED THE [facility].

## 2020-07-09 ENCOUNTER — OFFICE VISIT (OUTPATIENT)
Dept: SLEEP MEDICINE | Facility: HOSPITAL | Age: 65
End: 2020-07-09

## 2020-07-09 VITALS
DIASTOLIC BLOOD PRESSURE: 76 MMHG | WEIGHT: 191 LBS | OXYGEN SATURATION: 98 % | BODY MASS INDEX: 31.82 KG/M2 | SYSTOLIC BLOOD PRESSURE: 117 MMHG | HEART RATE: 70 BPM | HEIGHT: 65 IN

## 2020-07-09 DIAGNOSIS — G47.31 COMPLEX SLEEP APNEA SYNDROME: Primary | ICD-10-CM

## 2020-07-09 DIAGNOSIS — E66.9 OBESITY (BMI 30-39.9): ICD-10-CM

## 2020-07-09 PROBLEM — G47.34 SLEEP RELATED HYPOXIA: Status: RESOLVED | Noted: 2019-04-29 | Resolved: 2020-07-09

## 2020-07-09 PROBLEM — G47.39 COMPLEX SLEEP APNEA SYNDROME: Status: ACTIVE | Noted: 2018-04-30

## 2020-07-09 PROCEDURE — 99203 OFFICE O/P NEW LOW 30 MIN: CPT | Performed by: INTERNAL MEDICINE

## 2020-07-09 PROCEDURE — G0463 HOSPITAL OUTPT CLINIC VISIT: HCPCS

## 2020-07-09 NOTE — PROGRESS NOTES
[facility]  [address] [facility]  3rd [place] [address]  Phone   Fax       Nain Rangel  1955  64 y.o.  male      PCP:Gary Camejo MD    Type of service: Initial New Patient Office Visit  Date of service: 7/9/2020    Chief Complaint   Patient presents with   • Sleep Apnea   • Follow-up       History of present illness;  Nain Rangel is a new patient for me and was seen today for sleep related problems.  Patient used to see Dr. Jeffers in the past now he is switched over to me.  I have reviewed his medical charts.  Patient previously had a stroke 2 times due to embolic phenomena and he had a septal defect which was closed.  He underwent sleep study which showed significant obstructive sleep apnea along with central apnea and has been given ASV.  Initially his pressures were high at 25 and 20 but the pressures is been reduced to 18 I maximum and E minimum is reduced to 14.     Patient is using the device on a regular basis.    Patient gives the following sleep history.  Sleep schedule:  Bedtime: 11 PM  Wake time: 8 AM  Normally takes about S minutes to fall asleep  Average hours of sleep 8  Number of naps per day , no  When patient wakes up still feels tired and not rested    The smartcard download shows the following  Compliance 97%  Average use about 7 hours and 57 minutes  More than 4 hours is also 97%  Residual AHI 13/h which is same as before  Masks type full facemask  Kenneth MILLIGAN      Past Medical History:   Diagnosis Date   • Chest discomfort     PRESUME MUSCULOSKELETAL   • Cognitive deficits following cerebral infarction    • Dizzy spells    • Heart attack (CMS/HCC)    • Hyperlipidemia    • Myocardial infarction (CMS/HCC)    • Oth speech/lang deficits following oth cerebvasc disease    • Stroke (CMS/HCC)        Social history:  Shift work: He is disabled due to stroke  Tobacco use: No  Alcohol use: No  Caffeinated drinks: 1-2  Over-the-counter  sleeping aid and medications: No  Narcotic medications: No    Family Hx  Family History   Problem Relation Age of Onset   • Heart disease Mother    • Hypertension Mother    • Alcohol abuse Mother         NOT FOR MANY YEARS   • Cancer Sister    • Heart disease Father        Medications: reviewed    Current Outpatient Medications:   •  aspirin 81 MG tablet, Take 81 mg by mouth Daily., Disp: , Rfl:   •  citalopram (CeleXA) 20 MG tablet, Take 1 tablet by mouth Daily., Disp: 90 tablet, Rfl: 3  •  clopidogrel (PLAVIX) 75 MG tablet, Take 75 mg by mouth daily., Disp: , Rfl:   •  ezetimibe (ZETIA) 10 MG tablet, Take 10 mg by mouth daily., Disp: , Rfl:   •  folic acid (FOLVITE) 1 MG tablet, Take 1 mg by mouth daily., Disp: , Rfl:   •  isosorbide mononitrate (IMDUR) 30 MG 24 hr tablet, , Disp: , Rfl:   •  lansoprazole (PREVACID) 30 MG capsule, Take 1 capsule by mouth Daily., Disp: 90 capsule, Rfl: 3  •  LIVALO 4 MG tablet, Take 4 mg by mouth Daily., Disp: , Rfl:   •  methocarbamol (ROBAXIN) 500 MG tablet, Take 1 tablet by mouth 4 (Four) Times a Day As Needed for Muscle Spasms., Disp: 15 tablet, Rfl: 0  •  pantoprazole (PROTONIX) 40 MG EC tablet, Take 40 mg by mouth Daily., Disp: , Rfl:   •  pravastatin (PRAVACHOL) 10 MG tablet, Take 10 mg by mouth daily., Disp: , Rfl: 5  •  predniSONE (DELTASONE) 10 MG tablet, Take 4 tablets by mouth daily for 5 days then 2 tablets by mouth daily for 3 days then 1 tablet by mouth daily for 2 days, Disp: 28 tablet, Rfl: 0  •  PYRIDOXINE HCL PO, Take  by mouth Daily., Disp: , Rfl:   •  ranolazine (RANEXA) 1000 MG 12 hr tablet, Take 500 mg by mouth 2 (Two) Times a Day., Disp: , Rfl:     Review of systems:  Rhame Sleepiness Scale: Total score: 1   Negative for shortness of breath, cough, wheezing, chest pain, nausea and vomiting, palpitation, swelling of feet:    Morning headaches: no  Awaken with sore throat or dry mouth :no  Leg jerking at night: no  Crawly feeling/urge sensation to move in  "the legs:no  Teeth grinding:no  Sleepwalking, nightmares, muscle weakness with laughing or anger,sleep paralysis: no  Nasal Congestion: no  Nocturia (how many times/night): 2  Memory Problem: yes, with the stroke  Weight change, no    Physical exam:  Vitals:    07/09/20 0849   BP: 117/76   Pulse: 70   SpO2: 98%   Weight: 86.6 kg (191 lb)   Height: 165.1 cm (65\")    Body mass index is 31.78 kg/m². Neck Circumference: 17 inches  HEENT: Head is atraumatic, normocephalic  Eyes: pupils are round equal and reacting to light and accommodation, conjunctiva normal  Nose: no nasal septal defects or deviation and the nasal passages are clear, no nasal polyps,  Throat: tonsils are not enlarged, tongue normal size, oral airway Mallampati class 3  NECK: No lymphadenopathy, trachea is in the midline, thyroid not enlarged  RESPIRATORY SYSTEM: Breath sounds are equal on both sides, there are no wheezes or crackles  CARDIOVASULAR SYSTEM: Heart sounds are regular rhythm and yue rate, there are no murmurs or thrills  ABDOMEN: Soft, no hepatosplenomegaly, no evidence of ascites  EXTREMITES: No cyanosis, clubbing or edema   NEUROLOGICAL SYSTEM: Oriented x 3, no gross motor defects, gait normal    Medical records and labs reviewed in [facility] reviewed    Assessment and plan:  · Complex sleep apnea, patient has both obstructive and central sleep apnea.  He is on ASV device and using the device on a regular basis.  The device is benefiting the patient and it is medically necessary.  I have talked to the patient about the download data and the importance of using his ASV device due to central apneas.  I will see him in 1 year for follow-up.   · Obesity, patient's BMI is Body mass index is 31.78 kg/m².. I have discussed the relationship between weight and sleep apnea. Weight reduction is encouraged.  I will also discussed with the patient diet and exercise.  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  He is normally followed " by cardiology at [facility].  · Hypersomnia significant improvement with the use of device        Magaly Wilcox MD, [facility]  Sleep Medicine.(Board-certified)  [facility]  Medical Director for [facility]  7/9/2020

## 2021-03-19 ENCOUNTER — BULK ORDERING (OUTPATIENT)
Dept: CASE MANAGEMENT | Facility: OTHER | Age: 66
End: 2021-03-19

## 2021-03-19 DIAGNOSIS — Z23 IMMUNIZATION DUE: ICD-10-CM

## 2021-04-19 RX ORDER — CITALOPRAM 20 MG/1
20 TABLET ORAL DAILY
Qty: 90 TABLET | Refills: 3 | Status: SHIPPED | OUTPATIENT
Start: 2021-04-19 | End: 2021-04-20 | Stop reason: SDUPTHER

## 2021-04-20 NOTE — TELEPHONE ENCOUNTER
Caller: Jadyn Rangel    Relationship: Emergency Contact    Best call back number: [phone]  Medication needed:   Requested Prescriptions     Pending Prescriptions Disp Refills   • citalopram (CeleXA) 20 MG tablet 90 tablet 3     Sig: Take 1 tablet by mouth Daily.       When do you need the refill by: 04-  What additional details did the patient provide when requesting the medication: THE PATIENT'S WIFE STATED THAT [facility] SENT IN A REQUEST FOR THIS MEDICATION. THE PATIENT'S WIFE STATED THAT IT IS STILL NOT THERE AND THE PATIENT NEEDS THIS MEDICATION SENT TO THE PHARMACY ASAP. THE PATIENT DOES HAVE AN APPOINTMENT ON 05- WITH DR. JOHNSON AND NEEDS ENOUGH MEDICATION TO DO HIM UNTIL THEN.    PLEASE ADVISE    Does the patient have less than a 3 day supply:  [] Yes  [x] No    What is the patient's preferred pharmacy: [id number] - [place], [place] - [address] AT [facility] [address] - [phone]  - [phone] FX

## 2021-04-21 RX ORDER — CITALOPRAM 20 MG/1
20 TABLET ORAL DAILY
Qty: 90 TABLET | Refills: 3 | Status: SHIPPED | OUTPATIENT
Start: 2021-04-21 | End: 2022-03-23 | Stop reason: SDUPTHER

## 2021-05-12 ENCOUNTER — OFFICE VISIT (OUTPATIENT)
Dept: FAMILY MEDICINE CLINIC | Facility: CLINIC | Age: 66
End: 2021-05-12

## 2021-05-12 VITALS
BODY MASS INDEX: 31.25 KG/M2 | SYSTOLIC BLOOD PRESSURE: 110 MMHG | TEMPERATURE: 97.5 F | HEART RATE: 56 BPM | DIASTOLIC BLOOD PRESSURE: 70 MMHG | WEIGHT: 187.6 LBS | HEIGHT: 65 IN | OXYGEN SATURATION: 99 %

## 2021-05-12 DIAGNOSIS — D69.6 THROMBOCYTOPENIA (HCC): Primary | ICD-10-CM

## 2021-05-12 DIAGNOSIS — H61.21 EXCESSIVE CERUMEN IN RIGHT EAR CANAL: ICD-10-CM

## 2021-05-12 DIAGNOSIS — I21.9 MYOCARDIAL INFARCTION, UNSPECIFIED MI TYPE, UNSPECIFIED ARTERY (HCC): ICD-10-CM

## 2021-05-12 DIAGNOSIS — G47.10 HYPERSOMNIA WITH SLEEP APNEA: ICD-10-CM

## 2021-05-12 DIAGNOSIS — Z12.5 SCREENING FOR PROSTATE CANCER: ICD-10-CM

## 2021-05-12 DIAGNOSIS — I10 BENIGN ESSENTIAL HTN: ICD-10-CM

## 2021-05-12 DIAGNOSIS — I63.9 CEREBROVASCULAR ACCIDENT (CVA), UNSPECIFIED MECHANISM (HCC): ICD-10-CM

## 2021-05-12 DIAGNOSIS — G47.30 HYPERSOMNIA WITH SLEEP APNEA: ICD-10-CM

## 2021-05-12 DIAGNOSIS — E78.2 MIXED HYPERLIPIDEMIA: Primary | ICD-10-CM

## 2021-05-12 DIAGNOSIS — I69.319 COGNITIVE DEFICITS FOLLOWING CEREBRAL INFARCTION: ICD-10-CM

## 2021-05-12 LAB
ALBUMIN SERPL-MCNC: 4.4 G/DL (ref 3.5–5.2)
ALBUMIN/GLOB SERPL: 1.6 G/DL
ALP SERPL-CCNC: 64 U/L (ref 39–117)
ALT SERPL W P-5'-P-CCNC: 18 U/L (ref 1–41)
ANION GAP SERPL CALCULATED.3IONS-SCNC: 11.1 MMOL/L (ref 5–15)
AST SERPL-CCNC: 20 U/L (ref 1–40)
BILIRUB SERPL-MCNC: 0.4 MG/DL (ref 0–1.2)
BUN SERPL-MCNC: 19 MG/DL (ref 8–23)
BUN/CREAT SERPL: 17.6 (ref 7–25)
CALCIUM SPEC-SCNC: 9.4 MG/DL (ref 8.6–10.5)
CHLORIDE SERPL-SCNC: 105 MMOL/L (ref 98–107)
CHOLEST SERPL-MCNC: 126 MG/DL (ref 0–200)
CO2 SERPL-SCNC: 26.9 MMOL/L (ref 22–29)
CREAT SERPL-MCNC: 1.08 MG/DL (ref 0.76–1.27)
DEVELOPER EXPIRATION DATE: NORMAL
DEVELOPER LOT NUMBER: NORMAL
ERYTHROCYTE [DISTWIDTH] IN BLOOD BY AUTOMATED COUNT: 12.7 % (ref 12.3–15.4)
EXPIRATION DATE: NORMAL
FECAL OCCULT BLOOD SCREEN, POC: NEGATIVE
GFR SERPL CREATININE-BSD FRML MDRD: 69 ML/MIN/1.73
GLOBULIN UR ELPH-MCNC: 2.8 GM/DL
GLUCOSE SERPL-MCNC: 89 MG/DL (ref 65–99)
HCT VFR BLD AUTO: 41.1 % (ref 37.5–51)
HDLC SERPL-MCNC: 51 MG/DL (ref 40–60)
HGB BLD-MCNC: 12.9 G/DL (ref 13–17.7)
LDLC SERPL CALC-MCNC: 56 MG/DL (ref 0–100)
LDLC/HDLC SERPL: 1.06 {RATIO}
LYMPHOCYTES # BLD AUTO: 2 10*3/MM3 (ref 0.7–3.1)
LYMPHOCYTES NFR BLD AUTO: 39.8 % (ref 19.6–45.3)
Lab: NORMAL
MCH RBC QN AUTO: 29.2 PG (ref 26.6–33)
MCHC RBC AUTO-ENTMCNC: 31.4 G/DL (ref 31.5–35.7)
MCV RBC AUTO: 93 FL (ref 79–97)
MONOCYTES # BLD AUTO: 0.3 10*3/MM3 (ref 0.1–0.9)
MONOCYTES NFR BLD AUTO: 5.9 % (ref 5–12)
NEGATIVE CONTROL: NEGATIVE
NEUTROPHILS NFR BLD AUTO: 2.8 10*3/MM3 (ref 1.7–7)
NEUTROPHILS NFR BLD AUTO: 54.3 % (ref 42.7–76)
PLATELET # BLD AUTO: 125 10*3/MM3 (ref 140–450)
PMV BLD AUTO: 7.4 FL (ref 6–12)
POSITIVE CONTROL: POSITIVE
POTASSIUM SERPL-SCNC: 4.5 MMOL/L (ref 3.5–5.2)
PROT SERPL-MCNC: 7.2 G/DL (ref 6–8.5)
PSA SERPL-MCNC: 0.17 NG/ML (ref 0–4)
RBC # BLD AUTO: 4.42 10*6/MM3 (ref 4.14–5.8)
SODIUM SERPL-SCNC: 143 MMOL/L (ref 136–145)
TRIGL SERPL-MCNC: 105 MG/DL (ref 0–150)
VLDLC SERPL-MCNC: 19 MG/DL (ref 5–40)
WBC # BLD AUTO: 5.1 10*3/MM3 (ref 3.4–10.8)

## 2021-05-12 PROCEDURE — 36415 COLL VENOUS BLD VENIPUNCTURE: CPT | Performed by: FAMILY MEDICINE

## 2021-05-12 PROCEDURE — 99214 OFFICE O/P EST MOD 30 MIN: CPT | Performed by: FAMILY MEDICINE

## 2021-05-12 PROCEDURE — 82270 OCCULT BLOOD FECES: CPT | Performed by: FAMILY MEDICINE

## 2021-05-12 PROCEDURE — 69209 REMOVE IMPACTED EAR WAX UNI: CPT | Performed by: FAMILY MEDICINE

## 2021-05-12 PROCEDURE — 85025 COMPLETE CBC W/AUTO DIFF WBC: CPT | Performed by: FAMILY MEDICINE

## 2021-05-12 PROCEDURE — 80061 LIPID PANEL: CPT | Performed by: FAMILY MEDICINE

## 2021-05-12 PROCEDURE — G0103 PSA SCREENING: HCPCS | Performed by: FAMILY MEDICINE

## 2021-05-12 PROCEDURE — 80053 COMPREHEN METABOLIC PANEL: CPT | Performed by: FAMILY MEDICINE

## 2021-05-12 NOTE — PROGRESS NOTES
"SUBJECTIVE:  The patient is a 65-year-old male.  He is here for follow-up.  He has hyperlipidemia he history of CVA with some residual cognitive defects.  He has history of coronary artery disease. He complains of his right ear being plugged.    PAST MEDICAL HISTORY:  Reviewed.    REVIEW OF SYSTEMS:  Please see above.  All others reviewed and are negative.      OBJECTIVE:   /70 (BP Location: Left arm, Patient Position: Sitting, Cuff Size: Adult)   Pulse 56   Temp 97.5 °F (36.4 °C) (Temporal)   Ht 165.1 cm (65\")   Wt 85.1 kg (187 lb 9.6 oz)   SpO2 99%   BMI 31.22 kg/m²    Vitals signs are reviewed and are stable.    General:  Well-nourished.  Alert and oriented x3 in no acute distress.  HEENT: PERRLA. The right TM is occluded with cerumen  Neck:  Supple.   Lungs:  Clear.    Heart:  Regular rate and rhythm.   Abdomen:   Soft, nontender.   Extremities:  No cyanosis, clubbing or edema.   Neurological:  Grossly intact without motor or sensory deficits.     ASSESSMENT:      Diagnoses and all orders for this visit:    1. Mixed hyperlipidemia (Primary)  -     CBC & Differential  -     Comprehensive Metabolic Panel  -     Lipid Panel    2. Cerebrovascular accident (CVA), unspecified mechanism (CMS/HCC)  -     CBC & Differential  -     Comprehensive Metabolic Panel  -     Lipid Panel    3. Cognitive deficits following cerebral infarction  -     CBC & Differential  -     Comprehensive Metabolic Panel  -     Lipid Panel    4. Benign essential HTN  -     CBC & Differential  -     Comprehensive Metabolic Panel  -     Lipid Panel    5. Myocardial infarction, unspecified MI type, unspecified artery (CMS/HCC)  -     CBC & Differential  -     Comprehensive Metabolic Panel  -     Lipid Panel    6. Hypersomnia with sleep apnea  -     CBC & Differential  -     Comprehensive Metabolic Panel  -     Lipid Panel         PLAN:     "

## 2021-05-17 ENCOUNTER — LAB (OUTPATIENT)
Dept: FAMILY MEDICINE CLINIC | Facility: CLINIC | Age: 66
End: 2021-05-17

## 2021-05-17 LAB
BASOPHILS # BLD AUTO: 0.04 10*3/MM3 (ref 0–0.2)
BASOPHILS NFR BLD AUTO: 0.7 % (ref 0–1.5)
DEPRECATED RDW RBC AUTO: 37.9 FL (ref 37–54)
EOSINOPHIL # BLD AUTO: 0.42 10*3/MM3 (ref 0–0.4)
EOSINOPHIL NFR BLD AUTO: 7.8 % (ref 0.3–6.2)
ERYTHROCYTE [DISTWIDTH] IN BLOOD BY AUTOMATED COUNT: 12 % (ref 12.3–15.4)
HCT VFR BLD AUTO: 40.9 % (ref 37.5–51)
HGB BLD-MCNC: 13.9 G/DL (ref 13–17.7)
IMM GRANULOCYTES # BLD AUTO: 0.01 10*3/MM3 (ref 0–0.05)
IMM GRANULOCYTES NFR BLD AUTO: 0.2 % (ref 0–0.5)
LYMPHOCYTES # BLD AUTO: 2.01 10*3/MM3 (ref 0.7–3.1)
LYMPHOCYTES NFR BLD AUTO: 37.3 % (ref 19.6–45.3)
MCH RBC QN AUTO: 29.8 PG (ref 26.6–33)
MCHC RBC AUTO-ENTMCNC: 34 G/DL (ref 31.5–35.7)
MCV RBC AUTO: 87.8 FL (ref 79–97)
MONOCYTES # BLD AUTO: 0.63 10*3/MM3 (ref 0.1–0.9)
MONOCYTES NFR BLD AUTO: 11.7 % (ref 5–12)
NEUTROPHILS NFR BLD AUTO: 2.28 10*3/MM3 (ref 1.7–7)
NEUTROPHILS NFR BLD AUTO: 42.3 % (ref 42.7–76)
NRBC BLD AUTO-RTO: 0 /100 WBC (ref 0–0.2)
PLATELET # BLD AUTO: 164 10*3/MM3 (ref 140–450)
PMV BLD AUTO: 10.5 FL (ref 6–12)
RBC # BLD AUTO: 4.66 10*6/MM3 (ref 4.14–5.8)
WBC # BLD AUTO: 5.39 10*3/MM3 (ref 3.4–10.8)

## 2021-05-17 PROCEDURE — 36415 COLL VENOUS BLD VENIPUNCTURE: CPT | Performed by: FAMILY MEDICINE

## 2021-05-17 PROCEDURE — 85025 COMPLETE CBC W/AUTO DIFF WBC: CPT | Performed by: FAMILY MEDICINE

## 2021-07-15 ENCOUNTER — OFFICE VISIT (OUTPATIENT)
Dept: SLEEP MEDICINE | Facility: HOSPITAL | Age: 66
End: 2021-07-15

## 2021-07-15 VITALS
OXYGEN SATURATION: 98 % | DIASTOLIC BLOOD PRESSURE: 71 MMHG | BODY MASS INDEX: 30.82 KG/M2 | SYSTOLIC BLOOD PRESSURE: 114 MMHG | HEART RATE: 59 BPM | WEIGHT: 185 LBS | HEIGHT: 65 IN

## 2021-07-15 DIAGNOSIS — G47.31 COMPLEX SLEEP APNEA SYNDROME: Primary | ICD-10-CM

## 2021-07-15 DIAGNOSIS — I63.9 CEREBROVASCULAR ACCIDENT (CVA), UNSPECIFIED MECHANISM (HCC): ICD-10-CM

## 2021-07-15 PROCEDURE — 99213 OFFICE O/P EST LOW 20 MIN: CPT | Performed by: INTERNAL MEDICINE

## 2021-07-15 PROCEDURE — G0463 HOSPITAL OUTPT CLINIC VISIT: HCPCS

## 2021-07-15 NOTE — PROGRESS NOTES
"  [facility]  [address] [facility]  3rd [place] [address]  Phone   Fax       SLEEP CLINIC FOLLOW UP PROGRESS NOTE.    Nain Rangel  1955  65 y.o.  male      PCP: Gary Camejo MD      Date of visit: 7/15/2021    Chief Complaint   Patient presents with   • Sleep Apnea   • Obesity       HPI:  This is a 65 y.o. years old patient who has a history of complex sleep apnea is here for  the annual compliance follow-up.  Sleep apnea with AHI of 55/h.  Patient is using positive airway pressure therapy with ASV and the symptoms of snoring, non-restorative sleep and daytime excessive sleepiness have improved significantly on the therapy. Normally goes to bed at 11 PM and wakes up at 9 AM.  The patient wakes up 1 time(s) during the night and has no problem going back to sleep.  Feels refreshed after waking up.  Patient also denies headaches and nasal congestion.   Got this device in 2016, used to see Dr. Jeffers in the past  He also has a history of stroke secondary to embolic phenomena and he had a septal defect which was closed.    Mediactions and allergies are reviewed by me and documented in the encounter.     SOCIAL ( habits pertaining to sleep medicine)  History of smoking:No   History of alcohol use: 0 per week  Caffeine use: 1     REVIEW OF SYSTEMS:   Congers Sleepiness Scale :Total score: 2   Nsaal congestion:No   Dry mouth/nose:No   Post nasal drip; No   Acid reflux/Heartburn:Yes   Abd bloating:No   Morining headache:No   Anxiety:No   Depression:No    PHYSICAL EXAMINATION:  CONSTITUTIONAL:  Vitals:    07/15/21 0700   BP: 114/71   Pulse: 59   SpO2: 98%   Weight: 83.9 kg (185 lb)   Height: 165.1 cm (65\")    Body mass index is 30.79 kg/m².   NOSE: nasal passages are clear, no nasal polyps, septum in the midline.  THROAT: throat is clear, oral airway Mallampati class 3  RESP SYSTEM: Breath sounds are normal, no wheezes or crackles  CARDIOVASULAR: Heart rate is " regular without murmur. No edema      Data reviewed:  The Smart card downloaded on 7/15/2021 has been reviewed independently by me for compliance and discussed the data with the patient.   Compliance; 100%  More than 4 hr use, 100%  Average use of the device 1 hours and 27 minutes per night  Residual AHI: 10 /hr (goal < 5.0 /hr), is acceptable previously it was 13  Mask type: Full facemask  DME: [employer]       ASSESSMENT AND PLAN:  · Complex sleep apnea which is both obstructive and central sleep apnea.  The ASV is very important for him and I have asked him to continue to use even though there is a recall.  He does not use so clean the ozone .  The symptoms of sleep apnea have improved with the device and the treatment.  Patient's compliance with the device is excellent for treatment of sleep apnea.  I have independently reviewed the smart card down load and discussed with the patient the download data and encouarged the patient to continue to use the device.The residual AHI is acceptable. The device is benefiting the patient and the device is medically necessary.  Without proper control of sleep apnea and good compliance there is a increased risk for hypertension, diabetes mellitus and nonrestorative sleep with hypersomnia which can increase risk for motor vehicle accidents.  Untreated sleep apnea is also a risk factor for development of atrial fibrillation, pulmonary hypertension and stroke. The patient is also instructed to get the supplies from the DME company and and change them on a regular basis.  A prescription for supplies has been sent to the DME company.  I have also discussed the good sleep hygiene habits and adequate amount of sleep needed for good health.  · Obesity, class 1 with BMI is Body mass index is 30.79 kg/m².. I have discuss the relationship between the weight and sleep apnea. The benefit of weight loss in reducing severity of sleep apnea was discussed. Discussed diet and  exercise with the patient to archive ideal BMI.  · Strep stroke secondary to embolic phenomena due to septal defect which was closed.  · Return in about 1 year (around 7/15/2022) for Annual visit with smartcard download. . Patient's questions were answered.        Magaly Wilcox MD  Sleep Medicine.  Medical Director, [facility], [facility]  7/15/2021 ,

## 2022-03-23 ENCOUNTER — OFFICE VISIT (OUTPATIENT)
Dept: FAMILY MEDICINE CLINIC | Facility: CLINIC | Age: 67
End: 2022-03-23

## 2022-03-23 VITALS
OXYGEN SATURATION: 98 % | RESPIRATION RATE: 18 BRPM | HEIGHT: 65 IN | WEIGHT: 192 LBS | TEMPERATURE: 98 F | BODY MASS INDEX: 31.99 KG/M2 | SYSTOLIC BLOOD PRESSURE: 118 MMHG | HEART RATE: 68 BPM | DIASTOLIC BLOOD PRESSURE: 70 MMHG

## 2022-03-23 DIAGNOSIS — M54.50 LUMBAR PAIN: ICD-10-CM

## 2022-03-23 DIAGNOSIS — I25.10 CORONARY ARTERY DISEASE INVOLVING NATIVE CORONARY ARTERY OF NATIVE HEART WITHOUT ANGINA PECTORIS: Primary | ICD-10-CM

## 2022-03-23 DIAGNOSIS — I10 BENIGN ESSENTIAL HTN: ICD-10-CM

## 2022-03-23 DIAGNOSIS — E78.2 MIXED HYPERLIPIDEMIA: ICD-10-CM

## 2022-03-23 LAB
ALBUMIN SERPL-MCNC: 4.7 G/DL (ref 3.5–5.2)
ALBUMIN/GLOB SERPL: 2.2 G/DL
ALP SERPL-CCNC: 68 U/L (ref 39–117)
ALT SERPL W P-5'-P-CCNC: 17 U/L (ref 1–41)
ANION GAP SERPL CALCULATED.3IONS-SCNC: 10 MMOL/L (ref 5–15)
AST SERPL-CCNC: 17 U/L (ref 1–40)
BASOPHILS # BLD AUTO: 0.05 10*3/MM3 (ref 0–0.2)
BASOPHILS NFR BLD AUTO: 1.1 % (ref 0–1.5)
BILIRUB SERPL-MCNC: 0.3 MG/DL (ref 0–1.2)
BUN SERPL-MCNC: 22 MG/DL (ref 8–23)
BUN/CREAT SERPL: 20.8 (ref 7–25)
CALCIUM SPEC-SCNC: 9.2 MG/DL (ref 8.6–10.5)
CHLORIDE SERPL-SCNC: 105 MMOL/L (ref 98–107)
CHOLEST SERPL-MCNC: 138 MG/DL (ref 0–200)
CO2 SERPL-SCNC: 26 MMOL/L (ref 22–29)
CREAT SERPL-MCNC: 1.06 MG/DL (ref 0.76–1.27)
DEPRECATED RDW RBC AUTO: 42.8 FL (ref 37–54)
EGFRCR SERPLBLD CKD-EPI 2021: 77.9 ML/MIN/1.73
EOSINOPHIL # BLD AUTO: 0.23 10*3/MM3 (ref 0–0.4)
EOSINOPHIL NFR BLD AUTO: 5.1 % (ref 0.3–6.2)
ERYTHROCYTE [DISTWIDTH] IN BLOOD BY AUTOMATED COUNT: 13.1 % (ref 12.3–15.4)
GLOBULIN UR ELPH-MCNC: 2.1 GM/DL
GLUCOSE SERPL-MCNC: 79 MG/DL (ref 65–99)
HCT VFR BLD AUTO: 42.3 % (ref 37.5–51)
HDLC SERPL-MCNC: 53 MG/DL (ref 40–60)
HGB BLD-MCNC: 13.8 G/DL (ref 13–17.7)
IMM GRANULOCYTES # BLD AUTO: 0.01 10*3/MM3 (ref 0–0.05)
IMM GRANULOCYTES NFR BLD AUTO: 0.2 % (ref 0–0.5)
LDLC SERPL CALC-MCNC: 67 MG/DL (ref 0–100)
LDLC/HDLC SERPL: 1.23 {RATIO}
LYMPHOCYTES # BLD AUTO: 1.67 10*3/MM3 (ref 0.7–3.1)
LYMPHOCYTES NFR BLD AUTO: 36.7 % (ref 19.6–45.3)
MCH RBC QN AUTO: 29.1 PG (ref 26.6–33)
MCHC RBC AUTO-ENTMCNC: 32.6 G/DL (ref 31.5–35.7)
MCV RBC AUTO: 89.2 FL (ref 79–97)
MONOCYTES # BLD AUTO: 0.46 10*3/MM3 (ref 0.1–0.9)
MONOCYTES NFR BLD AUTO: 10.1 % (ref 5–12)
NEUTROPHILS NFR BLD AUTO: 2.13 10*3/MM3 (ref 1.7–7)
NEUTROPHILS NFR BLD AUTO: 46.8 % (ref 42.7–76)
NRBC BLD AUTO-RTO: 0 /100 WBC (ref 0–0.2)
PLATELET # BLD AUTO: 167 10*3/MM3 (ref 140–450)
PMV BLD AUTO: 10.5 FL (ref 6–12)
POTASSIUM SERPL-SCNC: 4.8 MMOL/L (ref 3.5–5.2)
PROT SERPL-MCNC: 6.8 G/DL (ref 6–8.5)
RBC # BLD AUTO: 4.74 10*6/MM3 (ref 4.14–5.8)
SODIUM SERPL-SCNC: 141 MMOL/L (ref 136–145)
TRIGL SERPL-MCNC: 98 MG/DL (ref 0–150)
VLDLC SERPL-MCNC: 18 MG/DL (ref 5–40)
WBC NRBC COR # BLD: 4.55 10*3/MM3 (ref 3.4–10.8)

## 2022-03-23 PROCEDURE — 72100 X-RAY EXAM L-S SPINE 2/3 VWS: CPT | Performed by: FAMILY MEDICINE

## 2022-03-23 PROCEDURE — 1125F AMNT PAIN NOTED PAIN PRSNT: CPT | Performed by: FAMILY MEDICINE

## 2022-03-23 PROCEDURE — G0439 PPPS, SUBSEQ VISIT: HCPCS | Performed by: FAMILY MEDICINE

## 2022-03-23 PROCEDURE — 36415 COLL VENOUS BLD VENIPUNCTURE: CPT | Performed by: FAMILY MEDICINE

## 2022-03-23 PROCEDURE — 1159F MED LIST DOCD IN RCRD: CPT | Performed by: FAMILY MEDICINE

## 2022-03-23 PROCEDURE — 1170F FXNL STATUS ASSESSED: CPT | Performed by: FAMILY MEDICINE

## 2022-03-23 PROCEDURE — 85025 COMPLETE CBC W/AUTO DIFF WBC: CPT | Performed by: FAMILY MEDICINE

## 2022-03-23 PROCEDURE — 80053 COMPREHEN METABOLIC PANEL: CPT | Performed by: FAMILY MEDICINE

## 2022-03-23 PROCEDURE — 80061 LIPID PANEL: CPT | Performed by: FAMILY MEDICINE

## 2022-03-23 PROCEDURE — 96160 PT-FOCUSED HLTH RISK ASSMT: CPT | Performed by: FAMILY MEDICINE

## 2022-03-23 RX ORDER — MELOXICAM 15 MG/1
15 TABLET ORAL DAILY
Qty: 30 TABLET | Refills: 1 | Status: SHIPPED | OUTPATIENT
Start: 2022-03-23 | End: 2023-03-29

## 2022-03-23 RX ORDER — EVOLOCUMAB 140 MG/ML
INJECTION, SOLUTION SUBCUTANEOUS
COMMUNITY
Start: 2022-03-10

## 2022-03-23 RX ORDER — CITALOPRAM 20 MG/1
20 TABLET ORAL DAILY
Qty: 90 TABLET | Refills: 3 | Status: SHIPPED | OUTPATIENT
Start: 2022-03-23 | End: 2023-03-29 | Stop reason: SDUPTHER

## 2022-03-23 NOTE — PROGRESS NOTES
The ABCs of the Annual Wellness Visit  Subsequent Medicare Wellness Visit    Chief Complaint   Patient presents with   • Medicare Wellness-subsequent   • hip and leg pain right worse     Had an old MObic he took which helped some can he get refill      Subjective    History of Present Illness:  Nain Rangel is a 66 y.o. male who presents for a Subsequent Medicare Wellness Visit.    The following portions of the patient's history were reviewed and   updated as appropriate: past family history and past medical history.    Compared to one year ago, the patient feels his physical   health is worse.    Compared to one year ago, the patient feels his mental   health is the same.    Recent Hospitalizations:  He was not admitted to the hospital during the last year.       Current Medical Providers:  Patient Care Team:  Gary Camejo MD as PCP - General  Gary Camejo MD as PCP - Family Medicine  EdwigeCali wells MD as Consulting Physician (Interventional Cardiology)  Tomi Jeffers MD as Consulting Physician (Pulmonary Disease)  William Gloria MD as Consulting Physician (Hand Surgery)    Outpatient Medications Prior to Visit   Medication Sig Dispense Refill   • clopidogrel (PLAVIX) 75 MG tablet Take 75 mg by mouth daily.     • folic acid (FOLVITE) 1 MG tablet Take 1 mg by mouth daily.     • isosorbide mononitrate (IMDUR) 30 MG 24 hr tablet      • pantoprazole (PROTONIX) 40 MG EC tablet Take 40 mg by mouth Daily.     • ranolazine (RANEXA) 1000 MG 12 hr tablet Take 500 mg by mouth 2 (Two) Times a Day.     • Repatha SureClick solution auto-injector SureClick injection ADMINISTER 1 ML UNDER THE SKIN EVERY 14 DAYS     • citalopram (CeleXA) 20 MG tablet Take 1 tablet by mouth Daily. 90 tablet 3   • aspirin 81 MG tablet Take 81 mg by mouth Daily.     • ezetimibe (ZETIA) 10 MG tablet Take 10 mg by mouth daily.     • lansoprazole (PREVACID) 30 MG capsule Take 1 capsule by mouth Daily. 90 capsule 3   • LIVALO 4 MG  "tablet Take 4 mg by mouth Daily.     • methocarbamol (ROBAXIN) 500 MG tablet Take 1 tablet by mouth 4 (Four) Times a Day As Needed for Muscle Spasms. 15 tablet 0   • pravastatin (PRAVACHOL) 10 MG tablet Take 10 mg by mouth daily.  5   • predniSONE (DELTASONE) 10 MG tablet Take 4 tablets by mouth daily for 5 days then 2 tablets by mouth daily for 3 days then 1 tablet by mouth daily for 2 days 28 tablet 0   • PYRIDOXINE HCL PO Take  by mouth Daily.       No facility-administered medications prior to visit.       No opioid medication identified on active medication list. I have reviewed chart for other potential  high risk medication/s and harmful drug interactions in the elderly.          Aspirin is on active medication list. Aspirin use is not indicated based on review of current medical condition/s. Risk of harm outweighs potential benefits. Patient instructed to discontinue this medication.  .      Patient Active Problem List   Diagnosis   • Stroke (HCC)   • Oth speech/lang deficits following oth cerebvasc disease   • Myocardial infarction (HCC)   • Hyperlipidemia   • Dizzy spells   • Cognitive deficits following cerebral infarction   • Chest discomfort   • Complex sleep apnea syndrome   • Paradoxical embolism (HCC)   • Benign essential HTN   • Obesity (BMI 30-39.9)   • Hypersomnia with sleep apnea     Advance Care Planning  Advance Directive is not on file.  ACP discussion was held with the patient during this visit. Patient does not have an advance directive, information provided.          Objective    Vitals:    03/23/22 1124   BP: 118/70   BP Location: Left arm   Patient Position: Sitting   Pulse: 68   Resp: 18   Temp: 98 °F (36.7 °C)   TempSrc: Infrared   SpO2: 98%   Weight: 87.1 kg (192 lb)   Height: 165.1 cm (65\")   PainSc:   7   PainLoc: Hip     BMI Readings from Last 1 Encounters:   03/23/22 31.95 kg/m²   BMI is above normal parameters. Recommendations include: exercise counseling    Does the patient have " evidence of cognitive impairment? No    Physical Exam            HEALTH RISK ASSESSMENT    Smoking Status:  Social History     Tobacco Use   Smoking Status Never Smoker   Smokeless Tobacco Never Used     Alcohol Consumption:  Social History     Substance and Sexual Activity   Alcohol Use No    Comment: RARELY     Fall Risk Screen:    JAZ Fall Risk Assessment has not been completed.    Depression Screening:  PHQ-2/PHQ-9 Depression Screening 3/23/2022   Retired Total Score -   Little Interest or Pleasure in Doing Things 0-->not at all   Feeling Down, Depressed or Hopeless 0-->not at all   PHQ-9: Brief Depression Severity Measure Score 0       Health Habits and Functional and Cognitive Screening:  Functional & Cognitive Status 3/23/2022   Do you have difficulty preparing food and eating? No   Do you have difficulty bathing yourself, getting dressed or grooming yourself? No   Do you have difficulty using the toilet? No   Do you have difficulty moving around from place to place? No   Do you have trouble with steps or getting out of a bed or a chair? No   Current Diet Well Balanced Diet   Dental Exam Up to date   Eye Exam Up to date   Exercise (times per week) 0 times per week   Current Exercise Activities Include -   Do you need help using the phone?  No   Are you deaf or do you have serious difficulty hearing?  No   Do you need help with transportation? No   Do you need help shopping? No   Do you need help preparing meals?  No   Do you need help with housework?  No   Do you need help with laundry? No   Do you need help taking your medications? No   Do you need help managing money? No   Do you ever drive or ride in a car without wearing a seat belt? No   Have you felt unusual stress, anger or loneliness in the last month? No   Who do you live with? Spouse   If you need help, do you have trouble finding someone available to you? No   Have you been bothered in the last four weeks by sexual problems? No   Do you have  difficulty concentrating, remembering or making decisions? No       Age-appropriate Screening Schedule:  Refer to the list below for future screening recommendations based on patient's age, sex and/or medical conditions. Orders for these recommended tests are listed in the plan section. The patient has been provided with a written plan.    Health Maintenance   Topic Date Due   • TDAP/TD VACCINES (1 - Tdap) Never done   • LIPID PANEL  03/03/2023   • INFLUENZA VACCINE  Completed   • ZOSTER VACCINE  Completed              Assessment/Plan   CMS Preventative Services Quick Reference  Risk Factors Identified During Encounter  None Identified  The above risks/problems have been discussed with the patient.  Follow up actions/plans if indicated are seen below in the Assessment/Plan Section.  Pertinent information has been shared with the patient in the After Visit Summary.    Diagnoses and all orders for this visit:    1. Coronary artery disease involving native coronary artery of native heart without angina pectoris (Primary)  -     CBC & Differential  -     Comprehensive Metabolic Panel  -     Lipid Panel    2. Mixed hyperlipidemia  -     CBC & Differential  -     Comprehensive Metabolic Panel  -     Lipid Panel    3. Benign essential HTN  -     CBC & Differential  -     Comprehensive Metabolic Panel  -     Lipid Panel    4. Lumbar pain  -     XR Spine Lumbar 2 or 3 View    Other orders  -     citalopram (CeleXA) 20 MG tablet; Take 1 tablet by mouth Daily.  Dispense: 90 tablet; Refill: 3        Follow Up:   No follow-ups on file.     An After Visit Summary and PPPS were made available to the patient.        I have reviewed the above.    SUBJECTIVE:  The patient is a 66-year-old male.  He received a Medicare wellness exam today.  He has coronary artery disease hypertension and hyperlipidemia.  He has a history of stroke.  He complains of some right hip and leg pain.  He took an Mobic which helped.  The pain actually starts  "in his right back goes down his buttocks and sometimes to his calf.  No injury.    PAST MEDICAL HISTORY:  Reviewed.    REVIEW OF SYSTEMS:  Please see above.  All others reviewed and are negative.      OBJECTIVE:   /70 (BP Location: Left arm, Patient Position: Sitting)   Pulse 68   Temp 98 °F (36.7 °C) (Infrared)   Resp 18   Ht 165.1 cm (65\")   Wt 87.1 kg (192 lb)   SpO2 98%   BMI 31.95 kg/m²    Vitals signs are reviewed and are stable.    General:  Well-nourished.  Alert and oriented x3 in no acute distress.  HEENT: PERRLA.   Neck:  Supple.   Lungs:  Clear.    Heart:  Regular rate and rhythm.   Abdomen:   Soft, nontender.   Back: Tenderness present on the right lateral lumbar sciatic region.  Pain with flexion extension.  Straight leg raise is negative.  Extremities:  No cyanosis, clubbing or edema.   Neurological:  Grossly intact without motor or sensory deficits.     ASSESSMENT:      Diagnoses and all orders for this visit:    1. Coronary artery disease involving native coronary artery of native heart without angina pectoris (Primary)  -     CBC & Differential  -     Comprehensive Metabolic Panel  -     Lipid Panel    2. Mixed hyperlipidemia  -     CBC & Differential  -     Comprehensive Metabolic Panel  -     Lipid Panel    3. Benign essential HTN  -     CBC & Differential  -     Comprehensive Metabolic Panel  -     Lipid Panel    4. Lumbar pain  -     XR Spine Lumbar 2 or 3 View    Other orders  -     citalopram (CeleXA) 20 MG tablet; Take 1 tablet by mouth Daily.  Dispense: 90 tablet; Refill: 3         PLAN: See above orders.  The patient will be prescribed meloxicam however he is advised not to take this until his labs are back.  Healthy lifestyle discussed.  If Mobic does not help we will consider physical therapy.  Call if problems.  Follow-up on labs.    Dictated utilizing Dragon dictation.             "

## 2022-05-24 ENCOUNTER — OFFICE VISIT (OUTPATIENT)
Dept: FAMILY MEDICINE CLINIC | Facility: CLINIC | Age: 67
End: 2022-05-24

## 2022-05-24 VITALS
OXYGEN SATURATION: 98 % | BODY MASS INDEX: 31.49 KG/M2 | RESPIRATION RATE: 20 BRPM | WEIGHT: 189 LBS | SYSTOLIC BLOOD PRESSURE: 120 MMHG | DIASTOLIC BLOOD PRESSURE: 70 MMHG | TEMPERATURE: 98 F | HEART RATE: 63 BPM | HEIGHT: 65 IN

## 2022-05-24 DIAGNOSIS — I10 BENIGN ESSENTIAL HTN: ICD-10-CM

## 2022-05-24 DIAGNOSIS — J01.10 ACUTE NON-RECURRENT FRONTAL SINUSITIS: Primary | ICD-10-CM

## 2022-05-24 DIAGNOSIS — R05.8 PRODUCTIVE COUGH: ICD-10-CM

## 2022-05-24 PROCEDURE — 99213 OFFICE O/P EST LOW 20 MIN: CPT | Performed by: NURSE PRACTITIONER

## 2022-05-24 RX ORDER — TICAGRELOR 90 MG/1
TABLET ORAL
COMMUNITY
Start: 2022-04-14

## 2022-05-24 RX ORDER — AZITHROMYCIN 250 MG/1
TABLET, FILM COATED ORAL
Qty: 6 TABLET | Refills: 0 | Status: SHIPPED | OUTPATIENT
Start: 2022-05-24 | End: 2022-11-03

## 2022-05-24 RX ORDER — BENZONATATE 100 MG/1
100 CAPSULE ORAL 3 TIMES DAILY PRN
Qty: 30 CAPSULE | Refills: 0 | Status: SHIPPED | OUTPATIENT
Start: 2022-05-24 | End: 2022-11-03

## 2022-05-24 NOTE — ASSESSMENT & PLAN NOTE
Patient refused COVID test today he took a COVID test that was negative yesterday.  Patient would like to wait for chest x-ray if he does not get better he will return to office.

## 2022-05-24 NOTE — PROGRESS NOTES
"Chief Complaint  Cough (Productive at times feels like bronchitis/ did home covid test yesterday neg)    Subjective          Nain Rangel presents to [facility]  Patient presents to the office today for a productive cough he reports feeling like bronchitis.  He has had symptoms for 2 to 3 days.  He did an at-home COVID test yesterday and was negative.  He denies fever chills shortness of air heat.  He denies loss of taste and or smell.  He denies muscle aches.  Blood pressure is 120/70.  Is to continue take blood pressure medication      Objective   Vital Signs:  /70 (BP Location: Left arm, Patient Position: Sitting)   Pulse 63   Temp 98 °F (36.7 °C) (Infrared)   Resp 20   Ht 165.1 cm (65\")   Wt 85.7 kg (189 lb)   SpO2 98%   BMI 31.45 kg/m²         Physical Exam  Constitutional:       Appearance: Normal appearance.   HENT:      Right Ear: Tympanic membrane, ear canal and external ear normal. No decreased hearing noted. No drainage or tenderness. No foreign body. Tympanic membrane is not perforated or erythematous.      Left Ear: Tympanic membrane and external ear normal. No decreased hearing noted. No drainage or tenderness. No foreign body. Tympanic membrane is not perforated or erythematous.      Nose: Nasal tenderness, mucosal edema, congestion and rhinorrhea present.      Right Turbinates: Not enlarged.      Left Turbinates: Not enlarged.      Right Sinus: Maxillary sinus tenderness and frontal sinus tenderness present.      Left Sinus: Maxillary sinus tenderness and frontal sinus tenderness present.      Mouth/Throat:      Mouth: Mucous membranes are moist.      Pharynx: Oropharyngeal exudate and posterior oropharyngeal erythema present.   Eyes:      General:         Right eye: No discharge.         Left eye: No discharge.      Conjunctiva/sclera: Conjunctivae normal.   Cardiovascular:      Rate and Rhythm: Normal rate and regular rhythm.      Pulses: Normal " pulses.      Heart sounds: Normal heart sounds. No murmur heard.  Pulmonary:      Breath sounds: Normal breath sounds. No wheezing or rhonchi.   Chest:      Chest wall: No tenderness.   Musculoskeletal:      Cervical back: No rigidity or tenderness.   Skin:     General: Skin is warm and dry.      Coloration: Skin is not pale.      Findings: No erythema.   Neurological:      Mental Status: He is alert.        Result Review :                 Assessment and Plan    Diagnoses and all orders for this visit:    1. Acute non-recurrent frontal sinusitis (Primary)  Assessment & Plan:  Increase fluids take Z-Michael as directed.    Orders:  -     azithromycin (ZITHROMAX) 250 MG tablet; Take 2 tablets the first day, then 1 tablet daily for 4 days.  Dispense: 6 tablet; Refill: 0    2. Productive cough  Assessment & Plan:  Patient refused COVID test today he took a COVID test that was negative yesterday.  Patient would like to wait for chest x-ray if he does not get better he will return to office.    Orders:  -     azithromycin (ZITHROMAX) 250 MG tablet; Take 2 tablets the first day, then 1 tablet daily for 4 days.  Dispense: 6 tablet; Refill: 0  -     benzonatate (Tessalon Perles) 100 MG capsule; Take 1 capsule by mouth 3 (Three) Times a Day As Needed for Cough.  Dispense: 30 capsule; Refill: 0    3. Benign essential HTN  Assessment & Plan:  Hypertension is improving with treatment.  Continue current treatment regimen.  Dietary sodium restriction.  Weight loss.  Regular aerobic exercise.  Continue current medications.  Blood pressure will be reassessed at the next regular appointment.    Discussed side effects of cough congestion.  I recommended patient to have chest x-ray today he will wait at this time.  Advised if he gets short of air chest pain to go to the emergency room       Follow Up   No follow-ups on file.  Patient was given instructions and counseling regarding his condition or for health maintenance advice. Please see  specific information pulled into the AVS if appropriate.

## 2022-07-14 ENCOUNTER — OFFICE VISIT (OUTPATIENT)
Dept: SLEEP MEDICINE | Facility: HOSPITAL | Age: 67
End: 2022-07-14

## 2022-07-14 VITALS
WEIGHT: 193 LBS | DIASTOLIC BLOOD PRESSURE: 69 MMHG | SYSTOLIC BLOOD PRESSURE: 126 MMHG | HEART RATE: 68 BPM | HEIGHT: 65 IN | BODY MASS INDEX: 32.15 KG/M2 | OXYGEN SATURATION: 97 %

## 2022-07-14 DIAGNOSIS — G47.31 COMPLEX SLEEP APNEA SYNDROME: Primary | ICD-10-CM

## 2022-07-14 DIAGNOSIS — I63.9 CEREBROVASCULAR ACCIDENT (CVA), UNSPECIFIED MECHANISM: ICD-10-CM

## 2022-07-14 DIAGNOSIS — E66.9 CLASS 1 OBESITY: ICD-10-CM

## 2022-07-14 PROBLEM — E66.811 CLASS 1 OBESITY: Status: ACTIVE | Noted: 2018-04-30

## 2022-07-14 PROCEDURE — G0463 HOSPITAL OUTPT CLINIC VISIT: HCPCS

## 2022-07-14 PROCEDURE — 99214 OFFICE O/P EST MOD 30 MIN: CPT | Performed by: INTERNAL MEDICINE

## 2022-07-14 NOTE — PROGRESS NOTES
"  [facility]  [address]  [address]  [place] [address]  Phone   Fax       SLEEP CLINIC FOLLOW UP PROGRESS NOTE.    Nain Rangel  [id number]   1955  66 y.o.  male      PCP: Gary Camejo MD      Date of visit: 7/14/2022    Chief Complaint   Patient presents with   • Sleep Apnea   • Obesity       HPI:  This is a 66 y.o. years old patient is here for the management of complex sleep apnea. . Patient is using positive airway pressure therapy with ASV.      He had a an PSG on 28 November 2015 which showed severe sleep apnea with AHI of 53.8/h.  He underwent CPAP and BiPAP titration and failed CPAP and BiPAP titration due to central sleep apnea and patient was titrated on the BiPAP ASV.  Patient has a history of stroke in the past.    The device he is more than 7 years old and is nonfunctional.  Still has a high residual AHI and he says that it also has a burnt smell.  The ASV needs to be repaired or replaced      Medications and allergies are reviewed by me and documented in the encounter.     SOCIAL (habits pertaining to sleep medicine)  History tobacco use:No   History of alcohol use: 0 per week  Caffeine use: 1     REVIEW OF SYSTEMS:   [place] Sleepiness Scale :Total score: 0   Nasal congestion:No   Dry mouth/nose:No   Post nasal drip; No   Acid reflux/Heartburn:No   Abd bloating:No   Morning headache:No   Anxiety:No   Depression:No    PHYSICAL EXAMINATION:  CONSTITUTIONAL:  Vitals:    07/14/22 0913   BP: 126/69   Pulse: 68   SpO2: 97%   Weight: 87.5 kg (193 lb)   Height: 165.1 cm (65\")    Body mass index is 32.12 kg/m².   NOSE: nasal passages are clear, No deformities noted   RESP SYSTEM: Not in any respiratory distress, no chest deformities noted,   CARDIOVASULAR: No edema noted  NEURO: Oriented x 3, gait normal,  Mood and affect appeared appropriate      Data reviewed:  The Smart card downloaded on 7/14/2022 has been reviewed independently by me for " compliance and discussed the data with the patient.   Compliance; 100%  More than 4 hr use, 97%  Average use of the device 7 hours and 57 minutes per night        ASSESSMENT AND PLAN:  · Complex sleep apnea, patient is on ASV.  However his device is more than 7 years old and it has a burnt smell and unable to use it and needs a new ASV.  I have reviewed his sleep study and titration study.  I am going to be sending a order for a new ASV to [facility].  Patient will have a follow-up for compliance after getting a new ASV for compliance check.  · History of stroke  · Obesity  1 with BMI is Body mass index is 32.12 kg/m².. I have discuss the relationship between the weight and sleep apnea. The benefit of weight loss in reducing severity of sleep apnea was discussed. Discussed diet and exercise with the patient to achieve ideal BMI.   · Return for 31 to 90 days after PAP setup with down load. . Patient's questions were answered.      Magaly Wilcox MD  Sleep Medicine.  Medical Director, [facility]  7/14/2022 ,

## 2022-11-03 ENCOUNTER — OFFICE VISIT (OUTPATIENT)
Dept: SLEEP MEDICINE | Facility: HOSPITAL | Age: 67
End: 2022-11-03

## 2022-11-03 VITALS — WEIGHT: 196 LBS | OXYGEN SATURATION: 98 % | BODY MASS INDEX: 32.65 KG/M2 | HEIGHT: 65 IN | HEART RATE: 61 BPM

## 2022-11-03 DIAGNOSIS — G47.31 COMPLEX SLEEP APNEA SYNDROME: Primary | ICD-10-CM

## 2022-11-03 DIAGNOSIS — E66.9 CLASS 1 OBESITY: ICD-10-CM

## 2022-11-03 PROCEDURE — 99213 OFFICE O/P EST LOW 20 MIN: CPT | Performed by: INTERNAL MEDICINE

## 2022-11-03 PROCEDURE — G0463 HOSPITAL OUTPT CLINIC VISIT: HCPCS

## 2022-11-03 NOTE — PROGRESS NOTES
"  [facility]  [address]  [address]  [place] [address]  Phone   Fax       SLEEP CLINIC FOLLOW UP PROGRESS NOTE.    Nain Rangel  [id number]   1955  66 y.o.  male      PCP: Gary Camejo MD      Date of visit: 11/3/2022    Chief Complaint   Patient presents with   • Sleep Apnea   • Obesity       HPI:  This is a 66 y.o. years old patient is here for the management of complex sleep apnea. . Patient is using positive airway pressure therapy with ASV.      He had a an PSG on 28 November 2015 which showed severe sleep apnea with AHI of 53.8/h.  He underwent CPAP and BiPAP titration and failed CPAP and BiPAP titration due to central sleep apnea and patient was titrated on the BiPAP ASV.  Patient has a history of stroke in the past.    Normally goes to bed around 11 PM  Wakes up around 8 AM  Wakes up 2 times a night    Patient feels well rested when he wakes up and is no problems.      Medications and allergies are reviewed by me and documented in the encounter.     SOCIAL (habits pertaining to sleep medicine)  History tobacco use:No   History of alcohol use: 0 per week  Caffeine use: 1     REVIEW OF SYSTEMS:   [place] Sleepiness Scale :Total score: 1   Nasal congestion:No   Dry mouth/nose:No   Post nasal drip; No   Acid reflux/Heartburn:No   Abd bloating:No   Morning headache:No   Anxiety:No   Depression:No    PHYSICAL EXAMINATION:  CONSTITUTIONAL:  Vitals:    11/03/22 0829   Pulse: 61   SpO2: 98%   Weight: 88.9 kg (196 lb)   Height: 165.1 cm (65\")    Body mass index is 32.62 kg/m².   NOSE: nasal passages are clear, No deformities noted   RESP SYSTEM: Not in any respiratory distress, no chest deformities noted,   CARDIOVASULAR: No edema noted  NEURO: Oriented x 3, gait normal,  Mood and affect appeared appropriate      Data reviewed:  The Smart card downloaded on 11/3/2022 has been reviewed independently by me for compliance and discussed the data with the " patient.   Compliance; 100%  More than 4 hr use, 100%  Average use of the device 7 hours and 57 minutes per night  AHI 4.7  Mask , fullface mask  Device, [employer] ASV  [facility]        ASSESSMENT AND PLAN:  · Complex sleep apnea, patient is on ASV.  Patient compliance is excellent and the residual AHI is acceptable which is less than 5.  I have talked to the patient about the download data and encouraged him to continue to use the ASV.  ASV is medically necessary and it is benefiting the patient.  He will continue to get the supplies from [facility] and I will see him in 1 year for follow-up  · Obesity  1 with BMI is Body mass index is 32.62 kg/m².. I have discuss the relationship between the weight and sleep apnea. The benefit of weight loss in reducing severity of sleep apnea was discussed. Discussed diet and exercise with the patient to achieve ideal BMI.   · History of stroke in the past  · Return in about 1 year (around 11/3/2023) for with smart card down load. . Patient's questions were answered.      Magaly Wilcox MD  Sleep Medicine.  Medical Director, [facility]  11/3/2022 ,

## 2023-02-02 ENCOUNTER — TELEPHONE (OUTPATIENT)
Dept: SLEEP MEDICINE | Facility: HOSPITAL | Age: 68
End: 2023-02-02
Payer: MEDICARE

## 2023-02-02 NOTE — TELEPHONE ENCOUNTER
Pt is requesting a pressure adjustment and for pressures to be lowered if possible. Pt may also need an adjustment to the ramp as well. Pt feels pressures are too high. Tech pulled download and will have Jennifer take a look   23.2

## 2023-02-03 ENCOUNTER — TELEPHONE (OUTPATIENT)
Dept: SLEEP MEDICINE | Facility: HOSPITAL | Age: 68
End: 2023-02-03
Payer: MEDICARE

## 2023-03-29 ENCOUNTER — OFFICE VISIT (OUTPATIENT)
Dept: FAMILY MEDICINE CLINIC | Facility: CLINIC | Age: 68
End: 2023-03-29
Payer: MEDICARE

## 2023-03-29 VITALS
BODY MASS INDEX: 32.32 KG/M2 | WEIGHT: 194 LBS | TEMPERATURE: 98.4 F | OXYGEN SATURATION: 91 % | RESPIRATION RATE: 18 BRPM | DIASTOLIC BLOOD PRESSURE: 88 MMHG | HEIGHT: 65 IN | HEART RATE: 56 BPM | SYSTOLIC BLOOD PRESSURE: 140 MMHG

## 2023-03-29 DIAGNOSIS — I25.10 CORONARY ARTERY DISEASE INVOLVING NATIVE CORONARY ARTERY OF NATIVE HEART WITHOUT ANGINA PECTORIS: ICD-10-CM

## 2023-03-29 DIAGNOSIS — M54.50 LUMBAR PAIN: ICD-10-CM

## 2023-03-29 DIAGNOSIS — M54.10 RADICULAR PAIN: ICD-10-CM

## 2023-03-29 DIAGNOSIS — Z12.5 PROSTATE CANCER SCREENING: ICD-10-CM

## 2023-03-29 DIAGNOSIS — Z00.00 MEDICARE ANNUAL WELLNESS VISIT, SUBSEQUENT: Primary | ICD-10-CM

## 2023-03-29 DIAGNOSIS — Z86.73 HISTORY OF CARDIOEMBOLIC CEREBROVASCULAR ACCIDENT (CVA): ICD-10-CM

## 2023-03-29 DIAGNOSIS — E78.2 MIXED HYPERLIPIDEMIA: ICD-10-CM

## 2023-03-29 LAB
ALBUMIN SERPL-MCNC: 4.7 G/DL (ref 3.5–5.2)
ALBUMIN/GLOB SERPL: 2 G/DL
ALP SERPL-CCNC: 73 U/L (ref 39–117)
ALT SERPL-CCNC: 16 U/L (ref 1–41)
AST SERPL-CCNC: 22 U/L (ref 1–40)
BASOPHILS # BLD AUTO: 0.03 10*3/MM3 (ref 0–0.2)
BASOPHILS NFR BLD AUTO: 0.5 % (ref 0–1.5)
BILIRUB SERPL-MCNC: 0.4 MG/DL (ref 0–1.2)
BUN SERPL-MCNC: 23 MG/DL (ref 8–23)
BUN/CREAT SERPL: 20.9 (ref 7–25)
CALCIUM SERPL-MCNC: 9.5 MG/DL (ref 8.6–10.5)
CHLORIDE SERPL-SCNC: 103 MMOL/L (ref 98–107)
CHOLEST SERPL-MCNC: 132 MG/DL (ref 0–200)
CO2 SERPL-SCNC: 27.3 MMOL/L (ref 22–29)
CREAT SERPL-MCNC: 1.1 MG/DL (ref 0.76–1.27)
DEVELOPER EXPIRATION DATE: NORMAL
DEVELOPER LOT NUMBER: NORMAL
EGFRCR SERPLBLD CKD-EPI 2021: 74 ML/MIN/1.73
EOSINOPHIL # BLD AUTO: 0.35 10*3/MM3 (ref 0–0.4)
EOSINOPHIL NFR BLD AUTO: 6.3 % (ref 0.3–6.2)
ERYTHROCYTE [DISTWIDTH] IN BLOOD BY AUTOMATED COUNT: 13.5 % (ref 12.3–15.4)
EXPIRATION DATE: NORMAL
FECAL OCCULT BLOOD SCREEN, POC: NEGATIVE
GLOBULIN SER CALC-MCNC: 2.4 GM/DL
GLUCOSE SERPL-MCNC: 104 MG/DL (ref 65–99)
HCT VFR BLD AUTO: 41.7 % (ref 37.5–51)
HDLC SERPL-MCNC: 57 MG/DL (ref 40–60)
HGB BLD-MCNC: 13.6 G/DL (ref 13–17.7)
IMM GRANULOCYTES # BLD AUTO: 0.02 10*3/MM3 (ref 0–0.05)
IMM GRANULOCYTES NFR BLD AUTO: 0.4 % (ref 0–0.5)
LDLC SERPL CALC-MCNC: 58 MG/DL (ref 0–100)
LYMPHOCYTES # BLD AUTO: 1.57 10*3/MM3 (ref 0.7–3.1)
LYMPHOCYTES NFR BLD AUTO: 28.3 % (ref 19.6–45.3)
Lab: NORMAL
MCH RBC QN AUTO: 28.8 PG (ref 26.6–33)
MCHC RBC AUTO-ENTMCNC: 32.6 G/DL (ref 31.5–35.7)
MCV RBC AUTO: 88.3 FL (ref 79–97)
MONOCYTES # BLD AUTO: 0.6 10*3/MM3 (ref 0.1–0.9)
MONOCYTES NFR BLD AUTO: 10.8 % (ref 5–12)
NEGATIVE CONTROL: NEGATIVE
NEUTROPHILS # BLD AUTO: 2.98 10*3/MM3 (ref 1.7–7)
NEUTROPHILS NFR BLD AUTO: 53.7 % (ref 42.7–76)
NRBC BLD AUTO-RTO: 0 /100 WBC (ref 0–0.2)
PLATELET # BLD AUTO: 178 10*3/MM3 (ref 140–450)
POSITIVE CONTROL: POSITIVE
POTASSIUM SERPL-SCNC: 4.6 MMOL/L (ref 3.5–5.2)
PROT SERPL-MCNC: 7.1 G/DL (ref 6–8.5)
PSA SERPL-MCNC: 0.42 NG/ML (ref 0–4)
RBC # BLD AUTO: 4.72 10*6/MM3 (ref 4.14–5.8)
SODIUM SERPL-SCNC: 140 MMOL/L (ref 136–145)
TRIGL SERPL-MCNC: 91 MG/DL (ref 0–150)
VLDLC SERPL CALC-MCNC: 17 MG/DL (ref 5–40)
WBC # BLD AUTO: 5.55 10*3/MM3 (ref 3.4–10.8)

## 2023-03-29 RX ORDER — CITALOPRAM 20 MG/1
20 TABLET ORAL DAILY
Qty: 90 TABLET | Refills: 3 | Status: SHIPPED | OUTPATIENT
Start: 2023-03-29

## 2023-03-29 RX ORDER — METHYLPREDNISOLONE 4 MG/1
TABLET ORAL
Qty: 21 TABLET | Refills: 0 | Status: SHIPPED | OUTPATIENT
Start: 2023-03-29

## 2023-03-29 RX ORDER — IBUPROFEN 200 MG
200 TABLET ORAL EVERY 6 HOURS PRN
COMMUNITY

## 2023-03-29 NOTE — PROGRESS NOTES
The ABCs of the Annual Wellness Visit  Subsequent Medicare Wellness Visit    Subjective    Nain Rangel is a 67 y.o. male who presents for a Subsequent Medicare Wellness Visit.    The following portions of the patient's history were reviewed and   updated as appropriate: past medical history.    Compared to one year ago, the patient feels his physical   health is worse.    Compared to one year ago, the patient feels his mental   health is the same.    Recent Hospitalizations:  He was not admitted to the hospital during the last year.       Current Medical Providers:  Patient Care Team:  Gary Camejo MD as PCP - General  Gary Camejo MD as PCP - Family Medicine  EdwigeCali wells MD as Consulting Physician (Interventional Cardiology)  Tomi Jeffers MD as Consulting Physician (Pulmonary Disease)  William Gloria MD as Consulting Physician (Hand Surgery)  Magaly Wilcox MD as Consulting Physician (Sleep Medicine)    Outpatient Medications Prior to Visit   Medication Sig Dispense Refill   • Brilinta 90 MG tablet tablet      • folic acid (FOLVITE) 1 MG tablet Take 1 tablet by mouth Daily.     • ibuprofen (ADVIL,MOTRIN) 200 MG tablet Take 1 tablet by mouth Every 6 (Six) Hours As Needed for Mild Pain.     • isosorbide mononitrate (IMDUR) 30 MG 24 hr tablet      • pantoprazole (PROTONIX) 40 MG EC tablet Take 1 tablet by mouth Daily.     • ranolazine (RANEXA) 1000 MG 12 hr tablet Take 500 mg by mouth 2 (Two) Times a Day.     • Repatha SureClick solution auto-injector SureClick injection ADMINISTER 1 ML UNDER THE SKIN EVERY 14 DAYS     • citalopram (CeleXA) 20 MG tablet Take 1 tablet by mouth Daily. 90 tablet 3   • meloxicam (MOBIC) 15 MG tablet Take 1 tablet by mouth Daily. 30 tablet 1     No facility-administered medications prior to visit.       No opioid medication identified on active medication list. I have reviewed chart for other potential  high risk medication/s and harmful drug interactions  "in the elderly.          Aspirin is not on active medication list.  Aspirin use is not indicated based on review of current medical condition/s. Risk of harm outweighs potential benefits.  .    Patient Active Problem List   Diagnosis   • Stroke (HCC)   • Oth speech/lang deficits following oth cerebvasc disease   • Myocardial infarction (HCC)   • Hyperlipidemia   • Dizzy spells   • Cognitive deficits following cerebral infarction   • Chest discomfort   • Complex sleep apnea syndrome   • Paradoxical embolism (HCC)   • Benign essential HTN   • Class 1 obesity   • Hypersomnia with sleep apnea   • Productive cough   • Acute non-recurrent frontal sinusitis     Advance Care Planning  Advance Directive is not on file.  ACP discussion was held with the patient during this visit. Patient has an advance directive (not in EMR), copy requested.     Objective    Vitals:    03/29/23 0804   BP: 140/88   BP Location: Left arm   Patient Position: Sitting   Cuff Size: Adult   Pulse: 56   Resp: 18   Temp: 98.4 °F (36.9 °C)   TempSrc: Infrared   SpO2: 91%   Weight: 88 kg (194 lb)   Height: 165.1 cm (65\")   PainSc:   7   PainLoc: Back     Estimated body mass index is 32.28 kg/m² as calculated from the following:    Height as of this encounter: 165.1 cm (65\").    Weight as of this encounter: 88 kg (194 lb).    BMI is >= 30 and <35. (Class 1 Obesity). The following options were offered after discussion;: exercise counseling/recommendations      Does the patient have evidence of cognitive impairment? No          HEALTH RISK ASSESSMENT    Smoking Status:  Social History     Tobacco Use   Smoking Status Never   Smokeless Tobacco Never     Alcohol Consumption:  Social History     Substance and Sexual Activity   Alcohol Use No    Comment: RARELY     Fall Risk Screen:    STEADI Fall Risk Assessment was completed, and patient is at LOW risk for falls.Assessment completed on:3/29/2023    Depression Screening:  PHQ-2/PHQ-9 Depression Screening " 3/29/2023   Little Interest or Pleasure in Doing Things 0-->not at all   Feeling Down, Depressed or Hopeless 0-->not at all   PHQ-9: Brief Depression Severity Measure Score 0       Health Habits and Functional and Cognitive Screening:  Functional & Cognitive Status 3/29/2023   Do you have difficulty preparing food and eating? No   Do you have difficulty bathing yourself, getting dressed or grooming yourself? No   Do you have difficulty using the toilet? No   Do you have difficulty moving around from place to place? No   Do you have trouble with steps or getting out of a bed or a chair? No   Current Diet Well Balanced Diet   Dental Exam Up to date   Eye Exam Up to date   Exercise (times per week) 3 times per week   Current Exercises Include Walking   Current Exercise Activities Include -   Do you need help using the phone?  No   Are you deaf or do you have serious difficulty hearing?  No   Do you need help with transportation? No   Do you need help shopping? No   Do you need help preparing meals?  No   Do you need help with housework?  No   Do you need help with laundry? No   Do you need help taking your medications? No   Do you need help managing money? No   Do you ever drive or ride in a car without wearing a seat belt? No   Have you felt unusual stress, anger or loneliness in the last month? No   Who do you live with? Spouse   If you need help, do you have trouble finding someone available to you? No   Have you been bothered in the last four weeks by sexual problems? No   Do you have difficulty concentrating, remembering or making decisions? No       Age-appropriate Screening Schedule:  Refer to the list below for future screening recommendations based on patient's age, sex and/or medical conditions. Orders for these recommended tests are listed in the plan section. The patient has been provided with a written plan.    Health Maintenance   Topic Date Due   • TDAP/TD VACCINES (1 - Tdap) Never done   • HEPATITIS C  "SCREENING  Never done   • COLORECTAL CANCER SCREENING  05/13/2021   • Pneumococcal Vaccine 65+ (1 - PCV) 11/14/2021   • LIPID PANEL  03/16/2024   • ANNUAL WELLNESS VISIT  03/29/2024   • COVID-19 Vaccine  Completed   • INFLUENZA VACCINE  Completed   • ZOSTER VACCINE  Completed                CMS Preventative Services Quick Reference  Risk Factors Identified During Encounter  Chronic Pain: MRI ordered.  The above risks/problems have been discussed with the patient.  Pertinent information has been shared with the patient in the After Visit Summary.  An After Visit Summary and PPPS were made available to the patient.    Follow Up:   Next Medicare Wellness visit to be scheduled in 1 year.       Additional E&M Note during same encounter follows:  Patient has multiple medical problems which are significant and separately identifiable that require additional work above and beyond the Medicare Wellness Visit.      Chief Complaint  Medicare Wellness-subsequent, Hyperlipidemia, discuss labs (See labs from cardiology told elevated kft he takes Ibuprofen for back if he cant take this he needs something else), and Back Pain (Still having pain radiating down both legs/ has been to PT and has used Mobic)    Subjective        HPI  Nain Rangel is also being seen today for hyperlipidemia, back pain, discuss elevated kft.       Objective   Vital Signs:  /88 (BP Location: Left arm, Patient Position: Sitting, Cuff Size: Adult)   Pulse 56   Temp 98.4 °F (36.9 °C) (Infrared)   Resp 18   Ht 165.1 cm (65\")   Wt 88 kg (194 lb)   SpO2 91%   BMI 32.28 kg/m²     Physical Exam        Assessment and Plan   Diagnoses and all orders for this visit:    1. Medicare annual wellness visit, subsequent (Primary)  -     CBC & Differential  -     Comprehensive Metabolic Panel  -     Lipid Panel  -     POCT occult blood x 1 stool    2. Coronary artery disease involving native coronary artery of native heart without angina pectoris  -     " "CBC & Differential  -     Comprehensive Metabolic Panel  -     Lipid Panel    3. Mixed hyperlipidemia  -     CBC & Differential  -     Comprehensive Metabolic Panel  -     Lipid Panel    4. History of cardioembolic cerebrovascular accident (CVA)  -     CBC & Differential  -     Comprehensive Metabolic Panel  -     Lipid Panel    5. Lumbar pain  -     MRI Lumbar Spine Without Contrast; Future    6. Radicular pain  -     MRI Lumbar Spine Without Contrast; Future    7. Prostate cancer screening  -     PSA Screen  -     POCT occult blood x 1 stool    Other orders  -     citalopram (CeleXA) 20 MG tablet; Take 1 tablet by mouth Daily.  Dispense: 90 tablet; Refill: 3  -     methylPREDNISolone (MEDROL) 4 MG dose pack; follow package directions  Dispense: 21 tablet; Refill: 0             Follow Up   No follow-ups on file.  Patient was given instructions and counseling regarding his condition or for health maintenance advice. Please see specific information pulled into the AVS if appropriate.       I have reviewed the above.    SUBJECTIVE:  The patient is a 67-year-old male.  He will receive a Medicare wellness exam today.  He has coronary artery disease hypertension hyperlipidemia and history of stroke.  He complains of lumbar pain.  He no longer uses anti-inflammatories because of renal insufficiency.  The pain is worse when he drives long distances.  Also affects him when he climbs a ladder.  He has surgery many years ago for his lumbar spine.    PAST MEDICAL HISTORY:  Reviewed.    REVIEW OF SYSTEMS:  Please see above.  All others reviewed and are negative.      OBJECTIVE:   /88 (BP Location: Left arm, Patient Position: Sitting, Cuff Size: Adult)   Pulse 56   Temp 98.4 °F (36.9 °C) (Infrared)   Resp 18   Ht 165.1 cm (65\")   Wt 88 kg (194 lb)   SpO2 91%   BMI 32.28 kg/m²    Vitals signs are reviewed and are stable.    General:  Well-nourished.  Alert and oriented x3 in no acute distress.  HEENT: PERRLA.   Neck: "  Supple.  No bruits  Lungs:  Clear.    Heart:  Regular rate and rhythm.   Abdomen:   Soft, nontender.  Bowel sounds present  Back: Healed incision noted midline.  Pain with flexion.  Straight leg raises equivocal.  Rectal: Prostate soft.  Right lobe is slightly larger than left.  Nontender.  No masses.  Hemoccult pending.  Extremities:  No cyanosis, clubbing or edema.   Neurological:  Grossly intact without motor or sensory deficits.     ASSESSMENT:      Diagnoses and all orders for this visit:    1. Medicare annual wellness visit, subsequent (Primary)  -     CBC & Differential  -     Comprehensive Metabolic Panel  -     Lipid Panel  -     POCT occult blood x 1 stool    2. Coronary artery disease involving native coronary artery of native heart without angina pectoris  -     CBC & Differential  -     Comprehensive Metabolic Panel  -     Lipid Panel    3. Mixed hyperlipidemia  -     CBC & Differential  -     Comprehensive Metabolic Panel  -     Lipid Panel    4. History of cardioembolic cerebrovascular accident (CVA)  -     CBC & Differential  -     Comprehensive Metabolic Panel  -     Lipid Panel    5. Lumbar pain  -     MRI Lumbar Spine Without Contrast; Future    6. Radicular pain  -     MRI Lumbar Spine Without Contrast; Future    7. Prostate cancer screening  -     PSA Screen  -     POCT occult blood x 1 stool    Other orders  -     citalopram (CeleXA) 20 MG tablet; Take 1 tablet by mouth Daily.  Dispense: 90 tablet; Refill: 3  -     methylPREDNISolone (MEDROL) 4 MG dose pack; follow package directions  Dispense: 21 tablet; Refill: 0         PLAN: See above orders.  We discussed healthy lifestyle.  The patient has gone through physical therapy and taking medication over the last several months for this without improvement.  He is going to follow-up on his labs and following his MRI.  We will decide what the next step will be once this is available.  He will call for any problems.    Dictated utilizing Dragon  dictation.

## 2023-03-29 NOTE — PATIENT INSTRUCTIONS
Medicare Wellness  Personal Prevention Plan of Service     Date of Office Visit:    Encounter Provider:  Gary Camejo MD  Place of Service:  [facility]  Patient Name: Nain Rangel  :  1955    As part of the Medicare Wellness portion of your visit today, we are providing you with this personalized preventive plan of services (PPPS). This plan is based upon recommendations of the [place] Preventive Services Task Force (USPSTF) and the Advisory Committee on Immunization Practices (ACIP).    This lists the preventive care services that should be considered, and provides dates of when you are due. Items listed as completed are up-to-date and do not require any further intervention.    Health Maintenance   Topic Date Due    TDAP/TD VACCINES (1 - Tdap) Never done    HEPATITIS C SCREENING  Never done    Pneumococcal Vaccine 65+ (1 - PCV) 2021    COLORECTAL CANCER SCREENING  2023    LIPID PANEL  2024    ANNUAL WELLNESS VISIT  2024    COVID-19 Vaccine  Completed    INFLUENZA VACCINE  Completed    ZOSTER VACCINE  Completed       Orders Placed This Encounter   Procedures    MRI Lumbar Spine Without Contrast     Standing Status:   Future     Standing Expiration Date:   3/29/2024    Comprehensive Metabolic Panel     Order Specific Question:   Release to patient     Answer:   Routine Release    Lipid Panel     Order Specific Question:   [facility] Has the patient fasted?     Answer:   Yes    PSA Screen     Order Specific Question:   Release to patient     Answer:   Routine Release    POCT occult blood x 1 stool     Order Specific Question:   Release to patient     Answer:   Routine Release    CBC & Differential     Order Specific Question:   Manual Differential     Answer:   No       No follow-ups on file.

## 2023-04-21 ENCOUNTER — HOSPITAL ENCOUNTER (OUTPATIENT)
Dept: MRI IMAGING | Facility: HOSPITAL | Age: 68
Discharge: HOME | End: 2023-04-21
Payer: MEDICARE

## 2023-04-21 DIAGNOSIS — M54.50 LUMBAR PAIN: ICD-10-CM

## 2023-04-21 DIAGNOSIS — M54.10 RADICULAR PAIN: ICD-10-CM

## 2023-04-21 PROCEDURE — 72148 MRI LUMBAR SPINE W/O DYE: CPT

## 2023-04-22 DIAGNOSIS — M48.061 SPINAL STENOSIS OF LUMBAR REGION, UNSPECIFIED WHETHER NEUROGENIC CLAUDICATION PRESENT: Primary | ICD-10-CM

## 2023-04-25 DIAGNOSIS — M54.50 LUMBAR PAIN: Primary | ICD-10-CM

## 2023-04-25 RX ORDER — GABAPENTIN 300 MG/1
CAPSULE ORAL
Qty: 90 CAPSULE | Refills: 0 | Status: SHIPPED | OUTPATIENT
Start: 2023-04-25

## 2023-05-26 ENCOUNTER — OFFICE VISIT (OUTPATIENT)
Dept: NEUROSURGERY | Facility: CLINIC | Age: 68
End: 2023-05-26
Payer: MEDICARE

## 2023-05-26 VITALS
HEIGHT: 65 IN | OXYGEN SATURATION: 97 % | RESPIRATION RATE: 18 BRPM | BODY MASS INDEX: 32.32 KG/M2 | HEART RATE: 64 BPM | WEIGHT: 194 LBS

## 2023-05-26 DIAGNOSIS — M51.36 LUMBAR DEGENERATIVE DISC DISEASE: ICD-10-CM

## 2023-05-26 DIAGNOSIS — M48.062 LUMBAR STENOSIS WITH NEUROGENIC CLAUDICATION: Primary | ICD-10-CM

## 2023-06-02 ENCOUNTER — TELEPHONE (OUTPATIENT)
Dept: NEUROSURGERY | Facility: CLINIC | Age: 68
End: 2023-06-02

## 2023-06-02 NOTE — TELEPHONE ENCOUNTER
Caller: Jadyn Rangel    Relationship: Emergency Contact    Best call back number: [phone]    What orders are you requesting (i.e. lab or imaging): PHYSICAL THERAPY    In what timeframe would the patient need to come in:ASAP    Where will you receive your lab/imaging services:[facility]     Additional notes:PTS WIFE CALLED AND STATES THAT THEY NEED AN ORDER SENT TO [facility] FOR PHYSICAL THERAPY-PT STATES THAT [facility] IS NEEDING WHEN HE CAN START AFTER HAVING HIS INJECTION AND DURATION THANK YOU [phone]

## 2023-06-06 ENCOUNTER — TELEPHONE (OUTPATIENT)
Dept: ORTHOPEDIC SURGERY | Facility: CLINIC | Age: 68
End: 2023-06-06
Payer: MEDICARE

## 2023-06-06 ENCOUNTER — TELEPHONE (OUTPATIENT)
Dept: NEUROSURGERY | Facility: CLINIC | Age: 68
End: 2023-06-06
Payer: MEDICARE

## 2023-06-06 DIAGNOSIS — M48.062 LUMBAR STENOSIS WITH NEUROGENIC CLAUDICATION: Primary | ICD-10-CM

## 2023-06-06 NOTE — TELEPHONE ENCOUNTER
New PT order faxed to [facility] as patient requested. Called and LVM to let the patient to let him know order has been faxed.

## 2023-06-06 NOTE — TELEPHONE ENCOUNTER
Patient called my direct selene in error and LVM requesting to speak with someone regarding Physical Therapy after Epidural Block scheduled for 06/08/2023. I tried returning call usingthe phone number pateint provided [phone] in order to direct patient to the correct department, but his voicemail is not set up therefore I was unable to leave a message.

## 2023-06-06 NOTE — TELEPHONE ENCOUNTER
Patient called and would like to start Physical Therapy after his injection which is scheduled for 6/8/23.  He did ask how soon he could start the Physical Therapy after the injection and I told him to ask the physician who gives him the injection. He will need a P.T. order put in and sent to [facility] in [place], [place].

## 2023-06-08 ENCOUNTER — HOSPITAL ENCOUNTER (OUTPATIENT)
Dept: GENERAL RADIOLOGY | Facility: HOSPITAL | Age: 68
Discharge: HOME | End: 2023-06-08
Payer: MEDICARE

## 2023-06-08 ENCOUNTER — HOSPITAL ENCOUNTER (OUTPATIENT)
Dept: PAIN MEDICINE | Facility: HOSPITAL | Age: 68
Discharge: HOME | End: 2023-06-08
Payer: MEDICARE

## 2023-06-08 ENCOUNTER — ANESTHESIA (OUTPATIENT)
Dept: PAIN MEDICINE | Facility: HOSPITAL | Age: 68
End: 2023-06-08
Payer: MEDICARE

## 2023-06-08 ENCOUNTER — ANESTHESIA EVENT (OUTPATIENT)
Dept: PAIN MEDICINE | Facility: HOSPITAL | Age: 68
End: 2023-06-08
Payer: MEDICARE

## 2023-06-08 VITALS
TEMPERATURE: 98 F | SYSTOLIC BLOOD PRESSURE: 122 MMHG | DIASTOLIC BLOOD PRESSURE: 84 MMHG | HEART RATE: 75 BPM | OXYGEN SATURATION: 97 % | HEIGHT: 65 IN | RESPIRATION RATE: 16 BRPM | BODY MASS INDEX: 32.28 KG/M2

## 2023-06-08 DIAGNOSIS — M48.062 SPINAL STENOSIS OF LUMBAR REGION WITH NEUROGENIC CLAUDICATION: Primary | ICD-10-CM

## 2023-06-08 DIAGNOSIS — R52 PAIN: ICD-10-CM

## 2023-06-08 PROCEDURE — 77003 FLUOROGUIDE FOR SPINE INJECT: CPT

## 2023-06-08 PROCEDURE — 25510000001 IOPAMIDOL 41 % SOLUTION: Performed by: ANESTHESIOLOGY

## 2023-06-08 PROCEDURE — 25010000002 FENTANYL CITRATE (PF) 50 MCG/ML SOLUTION: Performed by: ANESTHESIOLOGY

## 2023-06-08 PROCEDURE — 25010000002 METHYLPREDNISOLONE PER 80 MG: Performed by: ANESTHESIOLOGY

## 2023-06-08 RX ORDER — MIDAZOLAM HYDROCHLORIDE 1 MG/ML
1 INJECTION INTRAMUSCULAR; INTRAVENOUS ONCE AS NEEDED
Status: DISCONTINUED | OUTPATIENT
Start: 2023-06-08 | End: 2023-06-09 | Stop reason: HOSPADM

## 2023-06-08 RX ORDER — LIDOCAINE HYDROCHLORIDE 10 MG/ML
1 INJECTION, SOLUTION INFILTRATION; PERINEURAL ONCE
Status: DISCONTINUED | OUTPATIENT
Start: 2023-06-08 | End: 2023-06-09 | Stop reason: HOSPADM

## 2023-06-08 RX ORDER — METHYLPREDNISOLONE ACETATE 80 MG/ML
80 INJECTION, SUSPENSION INTRA-ARTICULAR; INTRALESIONAL; INTRAMUSCULAR; SOFT TISSUE ONCE
Status: COMPLETED | OUTPATIENT
Start: 2023-06-08 | End: 2023-06-08

## 2023-06-08 RX ORDER — SODIUM CHLORIDE 0.9 % (FLUSH) 0.9 %
10 SYRINGE (ML) INJECTION AS NEEDED
Status: DISCONTINUED | OUTPATIENT
Start: 2023-06-08 | End: 2023-06-09 | Stop reason: HOSPADM

## 2023-06-08 RX ORDER — SODIUM CHLORIDE 0.9 % (FLUSH) 0.9 %
3 SYRINGE (ML) INJECTION EVERY 12 HOURS SCHEDULED
Status: DISCONTINUED | OUTPATIENT
Start: 2023-06-08 | End: 2023-06-09 | Stop reason: HOSPADM

## 2023-06-08 RX ORDER — FENTANYL CITRATE 50 UG/ML
50 INJECTION, SOLUTION INTRAMUSCULAR; INTRAVENOUS ONCE
Status: COMPLETED | OUTPATIENT
Start: 2023-06-08 | End: 2023-06-08

## 2023-06-08 RX ADMIN — IOPAMIDOL 10 ML: 408 INJECTION, SOLUTION INTRATHECAL at 12:09

## 2023-06-08 RX ADMIN — METHYLPREDNISOLONE ACETATE 80 MG: 80 INJECTION, SUSPENSION INTRA-ARTICULAR; INTRALESIONAL; INTRAMUSCULAR; SOFT TISSUE at 12:09

## 2023-06-08 RX ADMIN — FENTANYL CITRATE 50 MCG: 50 INJECTION, SOLUTION INTRAMUSCULAR; INTRAVENOUS at 12:05

## 2023-06-08 NOTE — H&P
[facility]    History and Physical    Patient Name: Nain Rangel  :  1955  MRN:  [id number]  Date of Admission: 2023    Subjective     Patient is a 67 y.o. male presents with chief complaint of chronic low back pain.  Onset of symptoms was gradual starting several months ago.  Symptoms are associated/aggravated by nothing in particular. Symptoms improve with nothing    The following portions of the patients history were reviewed and updated as appropriate: current medications, allergies, past medical history, past surgical history, past family history, past social history, and problem list      He reports low back pain with radicular symptoms down both of his legs.  This is been going on for approximately 2 years.  The pain goes down the posterior lateral aspect of his legs and into his lower extremities below his knees.  It does not go into his foot.  It seems to follow about an L5 distribution.          Objective     Past Medical History:   Past Medical History:   Diagnosis Date    Chest discomfort     PRESUME MUSCULOSKELETAL    Cognitive deficits following cerebral infarction     Dizzy spells     Heart attack     Hyperlipidemia     Myocardial infarction     Oth speech/lang deficits following oth cerebvasc disease     Stroke      Past Surgical History:   Past Surgical History:   Procedure Laterality Date    BACK SURGERY      CARDIAC SURGERY      stent     COLONOSCOPY      CORONARY STENT PLACEMENT       Family History:   Family History   Problem Relation Age of Onset    Heart disease Mother     Hypertension Mother     Alcohol abuse Mother         NOT FOR MANY YEARS    Cancer Sister     Heart disease Father      Social History:   Social History     Socioeconomic History    Marital status:    Tobacco Use    Smoking status: Never    Smokeless tobacco: Never   Substance and Sexual Activity    Alcohol use: No     Comment: RARELY    Drug use: No       Vital Signs Range for the last 24  hours  Temperature:     Temp Source:     BP:     Pulse:     Respirations:     SPO2:     O2 Amount (l/min):     O2 Devices     Weight:           --------------------------------------------------------------------------------    Current Outpatient Medications   Medication Sig Dispense Refill    Brilinta 90 MG tablet tablet       citalopram (CeleXA) 20 MG tablet Take 1 tablet by mouth Daily. 90 tablet 3    folic acid (FOLVITE) 1 MG tablet Take 1 tablet by mouth Daily.      ibuprofen (ADVIL,MOTRIN) 200 MG tablet Take 1 tablet by mouth Every 6 (Six) Hours As Needed for Mild Pain.      isosorbide mononitrate (IMDUR) 30 MG 24 hr tablet       pantoprazole (PROTONIX) 40 MG EC tablet Take 1 tablet by mouth Daily.      ranolazine (RANEXA) 1000 MG 12 hr tablet Take 500 mg by mouth 2 (Two) Times a Day.      Repatha SureClick solution auto-injector SureClick injection ADMINISTER 1 ML UNDER THE SKIN EVERY 14 DAYS       Current Facility-Administered Medications   Medication Dose Route Frequency Provider Last Rate Last Admin    fentaNYL citrate (PF) (SUBLIMAZE) injection 50 mcg  50 mcg Intravenous Once Mitul Mayo MD        iopamidol (ISOVUE-M 200) injection 41%  10 mL Epidural Once in imaging Mitul Mayo MD        lidocaine (XYLOCAINE) 1 % injection 1 mL  1 mL Intradermal Once Mitul Mayo MD        methylPREDNISolone acetate (DEPO-medrol) injection 80 mg  80 mg Epidural Once Mitul Mayo MD        midazolam (VERSED) injection 1 mg  1 mg Intravenous Once PRN Mitul Mayo MD        sodium chloride 0.9 % flush 3 mL  3 mL Intravenous Q12H Mitul Mayo MD        And    sodium chloride 0.9 % flush 10 mL  10 mL Intravenous PRN Mitul Mayo MD           --------------------------------------------------------------------------------  Assessment & Plan      Anesthesia Evaluation                  Airway   Mallampati: II  TM distance: >3 FB  Dental      Pulmonary    (+) ,sleep apnea  (-) wheezes   Cardiovascular     Rhythm: regular    (+) hypertension, past MI hyperlipidemia    PE comment: Posterior tibial pulses are palpable bilaterally    Neuro/Psych  (+) CVA,   left straight leg raise test,   right straight leg raise test  GI/Hepatic/Renal/Endo      Musculoskeletal     Abdominal    Substance History      OB/GYN          Other                   Diagnosis and Plan    Treatment Plan  ASA 2      Procedures: Lumbar Epidural Steroid Injection(LESI), With fluoroscopy,      Anesthetic plan and risks discussed with patient.      Diagnosis     * Lumbar stenosis with neurogenic claudication [M48.062]

## 2023-06-08 NOTE — ANESTHESIA PROCEDURE NOTES
PAIN Epidural block    Pre-sedation assessment completed: 6/8/2023 12:02 PM    Patient reassessed immediately prior to procedure    Start Time: 6/8/2023 12:05 PM  Stop Time: 6/8/2023 12:12 PM  Indication:at surgeon's request and procedure for pain  Performed By  Anesthesiologist: Mitul Mayo MD  Preanesthetic Checklist  Completed: patient identified, risks and benefits discussed, surgical consent, monitors and equipment checked, pre-op evaluation and timeout performed  Additional Notes  Post-Op Diagnosis Codes:     * Lumbar stenosis with neurogenic claudication [M48.062]     * Lumbar radiculopathy [M54.16]    Prep:  Pt Position:prone  Sterile Tech:sterile barrier, mask and gloves  Prep:chlorhexidine gluconate and isopropyl alcohol  Monitoring:blood pressure monitoring, continuous pulse oximetry and EKG  Procedure:Sedation: yes     Approach:left paramedian  Guidance: fluoroscopy  Location:lumbar  Level:4-5  Needle Gauge:20 G  Aspiration:negative  Test Dose:negative  Medications:  Isovue:2mL  Comments:Sedation time was 1205 until 1212Depomedrol:80mg  Post Assessment:  Pt Tolerance:patient tolerated the procedure well with no apparent complications  Complications:no

## 2023-06-08 NOTE — DISCHARGE INSTRUCTIONS

## 2023-11-09 ENCOUNTER — OFFICE VISIT (OUTPATIENT)
Dept: SLEEP MEDICINE | Facility: HOSPITAL | Age: 68
End: 2023-11-09
Payer: MEDICARE

## 2023-11-09 VITALS
WEIGHT: 188.2 LBS | BODY MASS INDEX: 31.36 KG/M2 | OXYGEN SATURATION: 100 % | DIASTOLIC BLOOD PRESSURE: 69 MMHG | HEART RATE: 69 BPM | SYSTOLIC BLOOD PRESSURE: 131 MMHG | HEIGHT: 65 IN

## 2023-11-09 DIAGNOSIS — E66.9 CLASS 1 OBESITY: ICD-10-CM

## 2023-11-09 DIAGNOSIS — G47.31 COMPLEX SLEEP APNEA SYNDROME: Primary | ICD-10-CM

## 2023-11-09 PROBLEM — G47.10 HYPERSOMNIA WITH SLEEP APNEA: Status: RESOLVED | Noted: 2019-04-29 | Resolved: 2023-11-09

## 2023-11-09 PROBLEM — G47.30 HYPERSOMNIA WITH SLEEP APNEA: Status: RESOLVED | Noted: 2019-04-29 | Resolved: 2023-11-09

## 2023-11-09 PROCEDURE — G0463 HOSPITAL OUTPT CLINIC VISIT: HCPCS

## 2023-11-09 NOTE — PROGRESS NOTES
"  [facility]  [address]  [address]  [place] [address]  Phone   Fax       SLEEP CLINIC FOLLOW UP PROGRESS NOTE.    Nain Rangel  [id number]   1955  67 y.o.  male      PCP: Gary Camejo MD      Date of visit: 11/9/2023    Chief Complaint   Patient presents with    Sleep Apnea    Obesity       HPI:  This is a 67 y.o. years old patient is here for the management of complex sleep apnea. . Patient is using positive airway pressure therapy with ASV.      He had a an PSG on 28 November 2015 which showed severe sleep apnea with AHI of 53.8/h.  He underwent CPAP and BiPAP titration and failed CPAP and BiPAP titration due to central sleep apnea and patient is titrated on the ASV.  Patient has a history of stroke in the past.  He is a retired and he takes care of his 90-year-old father.  Patient reports that he does not have any daytime excessive sleepiness after starting using the device.    Normally goes to bed around 11 PM  Wakes up around 8 AM  Wakes up 2 times a night    Patient feels well rested when he wakes up and is no problems.      Medications and allergies are reviewed by me and documented in the encounter.     SOCIAL (habits pertaining to sleep medicine)  History tobacco use:No   History of alcohol use: 0 per week  Caffeine use: 1     REVIEW OF SYSTEMS:   [place] Sleepiness Scale :Total score: 8   Nasal congestion:No   Dry mouth/nose:No   Post nasal drip; No   Acid reflux/Heartburn:No   Abd bloating:No   Morning headache:No   Anxiety:No   Depression:No    PHYSICAL EXAMINATION:  CONSTITUTIONAL:  Vitals:    11/09/23 0859   BP: 131/69   Pulse: 69   SpO2: 100%   Weight: 85.4 kg (188 lb 3.2 oz)   Height: 165.1 cm (65\")    Body mass index is 31.32 kg/m².   NOSE: nasal passages are clear, No deformities noted   RESP SYSTEM: Not in any respiratory distress, no chest deformities noted,   CARDIOVASULAR: No edema noted  NEURO: Oriented x 3, gait normal,  Mood " and affect appeared appropriate      Data reviewed:  The Smart card downloaded on 11/9/2023 has been reviewed independently by me for compliance and discussed the data with the patient.   Compliance; 100%  More than 4 hr use, 100%  Average use of the device 7 hours and 57 minutes per night  AHI 4 per hour  Mask , fullface mask  Device, ResMed ASV  [facility], [facility]        ASSESSMENT AND PLAN:  Complex sleep apnea, patient is on ASV.  Patient compliance is excellent and the residual AHI is acceptable which is 4/h.  I have talked to the patient about the download data and encouraged him to continue to use the ASV.  ASV is medically necessary and it is benefiting the patient.  He will continue to get the supplies from [facility] and I will see him in 1 year for follow-up  Obesity  1 with BMI is Body mass index is 31.32 kg/m².. I have discuss the relationship between the weight and sleep apnea. The benefit of weight loss in reducing severity of sleep apnea was discussed. Discussed diet and exercise with the patient to achieve ideal BMI.   History of stroke in the past  Return in about 1 year (around 11/9/2024) for Next scheduled follow-up. . Patient's questions were answered.      Magaly Wilcox MD  Sleep Medicine.  Medical Director, [facility]  11/9/2023 ,

## 2024-12-12 ENCOUNTER — OFFICE VISIT (OUTPATIENT)
Dept: SLEEP MEDICINE | Facility: HOSPITAL | Age: 69
End: 2024-12-12
Payer: MEDICARE

## 2024-12-12 VITALS
OXYGEN SATURATION: 96 % | HEIGHT: 67 IN | WEIGHT: 193 LBS | SYSTOLIC BLOOD PRESSURE: 101 MMHG | HEART RATE: 72 BPM | BODY MASS INDEX: 30.29 KG/M2 | DIASTOLIC BLOOD PRESSURE: 68 MMHG

## 2024-12-12 DIAGNOSIS — G47.39 COMPLEX SLEEP APNEA SYNDROME: Primary | ICD-10-CM

## 2024-12-12 DIAGNOSIS — E66.811 CLASS 1 OBESITY: ICD-10-CM

## 2024-12-12 PROCEDURE — 3078F DIAST BP <80 MM HG: CPT | Performed by: INTERNAL MEDICINE

## 2024-12-12 PROCEDURE — 99214 OFFICE O/P EST MOD 30 MIN: CPT | Performed by: INTERNAL MEDICINE

## 2024-12-12 PROCEDURE — 1160F RVW MEDS BY RX/DR IN RCRD: CPT | Performed by: INTERNAL MEDICINE

## 2024-12-12 PROCEDURE — 1159F MED LIST DOCD IN RCRD: CPT | Performed by: INTERNAL MEDICINE

## 2024-12-12 PROCEDURE — 3074F SYST BP LT 130 MM HG: CPT | Performed by: INTERNAL MEDICINE

## 2024-12-12 PROCEDURE — G0463 HOSPITAL OUTPT CLINIC VISIT: HCPCS

## 2024-12-12 NOTE — PROGRESS NOTES
"  [facility]  [address]  [address]  [place] [address]  Phone   Fax       SLEEP CLINIC FOLLOW UP PROGRESS NOTE.    Nain Rangel  [id number]   1955  69 y.o.  male      PCP: Gary Camejo MD      Date of visit: 12/12/2024    Chief Complaint   Patient presents with    Sleep Apnea    Obesity       HPI:  This is a 69 y.o. years old patient is here for the management of complex sleep apnea. . Patient is using positive airway pressure therapy with ASV.      He had a an PSG on 28 November 2015 which showed severe sleep apnea with AHI of 53.8/h.  He underwent CPAP and BiPAP titration and failed CPAP and BiPAP titration due to central sleep apnea and patient is titrated on the ASV.  Patient has a history of stroke in the past.  He is a retired and he takes care of his 90-year-old father.  Patient reports that he does not have any daytime excessive sleepiness after starting using the device.    Normally goes to bed around 11 PM  Wakes up around 8 AM  Wakes up 2 times a night    Patient feels well rested when he wakes up and is no problems.      Medications and allergies are reviewed by me and documented in the encounter.     SOCIAL (habits pertaining to sleep medicine)  History tobacco use:No   History of alcohol use: 0 per week  Caffeine use: 1     REVIEW OF SYSTEMS:   [place] Sleepiness Scale :Total score: 2   Nasal congestion:No   Dry mouth/nose:No   Post nasal drip; No   Acid reflux/Heartburn:No   Abd bloating:No   Morning headache:No   Anxiety:No   Depression:No    PHYSICAL EXAMINATION:  CONSTITUTIONAL:  Vitals:    12/12/24 0904   BP: 101/68   Pulse: 72   SpO2: 96%   Weight: 87.5 kg (193 lb)   Height: 170.2 cm (67\")    Body mass index is 30.23 kg/m².   NOSE: nasal passages are clear, No deformities noted   RESP SYSTEM: Not in any respiratory distress, no chest deformities noted,   CARDIOVASULAR: No edema noted  NEURO: Oriented x 3, gait normal,  Mood and " affect appeared appropriate      Data reviewed:  The Smart card downloaded on 12/12/2024 has been reviewed independently by me for compliance and discussed the data with the patient.   Compliance; 100%  More than 4 hr use, 100%  Average use of the device 8 hours and 57 minutes per night  AHI 5 per hour  Mask , fullface mask  Device, ResMed ASV  DME, [facility]  Mask fit by [employer]      ASSESSMENT AND PLAN:  Complex sleep apnea, patient is on ASV.  Patient compliance is excellent and the residual AHI is acceptable which is 4/h.  I have talked to the patient about the download data and encouraged him to continue to use the ASV.  ASV is medically necessary and it is benefiting the patient.  He will continue to get the supplies from [facility] and I will see him in 1 year for follow-up  Obesity  1 with BMI is Body mass index is 30.23 kg/m².. I have discuss the relationship between the weight and sleep apnea. The benefit of weight loss in reducing severity of sleep apnea was discussed. Discussed diet and exercise with the patient to achieve ideal BMI.   History of stroke in the past  Return in about 1 year (around 12/12/2025) for with smart card down load. . Patient's questions were answered.      Magaly Wilcox MD  Sleep Medicine.  Medical Director, [facility] sleep [facility]  12/12/2024 ,